# Patient Record
Sex: FEMALE | RURAL
[De-identification: names, ages, dates, MRNs, and addresses within clinical notes are randomized per-mention and may not be internally consistent; named-entity substitution may affect disease eponyms.]

---

## 2018-04-02 ENCOUNTER — APPOINTMENT (OUTPATIENT)
Age: 83
Setting detail: DERMATOLOGY
End: 2018-04-03

## 2018-04-02 DIAGNOSIS — D22 MELANOCYTIC NEVI: ICD-10-CM

## 2018-04-02 DIAGNOSIS — L57.0 ACTINIC KERATOSIS: ICD-10-CM

## 2018-04-02 DIAGNOSIS — L82.1 OTHER SEBORRHEIC KERATOSIS: ICD-10-CM

## 2018-04-02 DIAGNOSIS — D485 NEOPLASM OF UNCERTAIN BEHAVIOR OF SKIN: ICD-10-CM

## 2018-04-02 DIAGNOSIS — L85.3 XEROSIS CUTIS: ICD-10-CM

## 2018-04-02 PROBLEM — D48.5 NEOPLASM OF UNCERTAIN BEHAVIOR OF SKIN: Status: ACTIVE | Noted: 2018-04-02

## 2018-04-02 PROBLEM — D22.39 MELANOCYTIC NEVI OF OTHER PARTS OF FACE: Status: ACTIVE | Noted: 2018-04-02

## 2018-04-02 PROCEDURE — OTHER COUNSELING: OTHER

## 2018-04-02 PROCEDURE — 11100: CPT | Mod: 59

## 2018-04-02 PROCEDURE — 17000 DESTRUCT PREMALG LESION: CPT

## 2018-04-02 PROCEDURE — OTHER LIQUID NITROGEN: OTHER

## 2018-04-02 PROCEDURE — 99203 OFFICE O/P NEW LOW 30 MIN: CPT | Mod: 25

## 2018-04-02 PROCEDURE — OTHER BIOPSY BY SHAVE METHOD: OTHER

## 2018-04-02 PROCEDURE — 17003 DESTRUCT PREMALG LES 2-14: CPT

## 2018-04-02 ASSESSMENT — LOCATION DETAILED DESCRIPTION DERM
LOCATION DETAILED: LEFT DISTAL DORSAL FOREARM
LOCATION DETAILED: LEFT PROXIMAL PRETIBIAL REGION
LOCATION DETAILED: RIGHT DISTAL DORSAL FOREARM
LOCATION DETAILED: RIGHT PROXIMAL DORSAL FOREARM
LOCATION DETAILED: RIGHT PROXIMAL PRETIBIAL REGION
LOCATION DETAILED: LEFT RADIAL DORSAL HAND
LOCATION DETAILED: LEFT ULNAR DORSAL HAND
LOCATION DETAILED: LEFT LATERAL PROXIMAL CALF
LOCATION DETAILED: LEFT PROXIMAL DORSAL FOREARM
LOCATION DETAILED: LEFT MEDIAL BUCCAL CHEEK

## 2018-04-02 ASSESSMENT — LOCATION SIMPLE DESCRIPTION DERM
LOCATION SIMPLE: LEFT CHEEK
LOCATION SIMPLE: RIGHT FOREARM
LOCATION SIMPLE: LEFT PRETIBIAL REGION
LOCATION SIMPLE: LEFT FOREARM
LOCATION SIMPLE: LEFT LOWER LEG
LOCATION SIMPLE: RIGHT PRETIBIAL REGION
LOCATION SIMPLE: LEFT HAND

## 2018-04-02 ASSESSMENT — LOCATION ZONE DERM
LOCATION ZONE: HAND
LOCATION ZONE: FACE
LOCATION ZONE: LEG
LOCATION ZONE: ARM

## 2018-04-02 NOTE — PROCEDURE: BIOPSY BY SHAVE METHOD
Render Post-Care Instructions In Note?: no
Billing Type: Third-Party Bill
Cryotherapy Text: The wound bed was treated with cryotherapy after the biopsy was performed.
Silver Nitrate Text: The wound bed was treated with silver nitrate after the biopsy was performed.
Consent: Written consent was obtained and risks were reviewed including but not limited to scarring, infection, bleeding, scabbing, incomplete removal, nerve damage and allergy to anesthesia.
Size Of Lesion In Cm: 0.5
Notification Instructions: Patient will be notified of biopsy results. However, patient instructed to call the office if not contacted within 2 weeks.
Type Of Destruction Used: Curettage
X Size Of Lesion In Cm: 0
Biopsy Method: 10 blade
Anesthesia Type: 1% lidocaine with epinephrine
Hemostasis: Electrocautery
Wound Care: Vaseline
Biopsy Type: H and E
Electrodesiccation Text: The wound bed was treated with electrodesiccation after the biopsy was performed.
Detail Level: Detailed
Curettage Text: The wound bed was treated with curettage after the biopsy was performed.
Dressing: bandage
Post-Care Instructions: I reviewed with the patient in detail post-care instructions. Patient is to keep the biopsy site dry overnight, and then apply bacitracin twice daily until healed. Patient may apply hydrogen peroxide soaks to remove any crusting.
Electrodesiccation And Curettage Text: The wound bed was treated with electrodesiccation and curettage after the biopsy was performed.

## 2018-04-23 ENCOUNTER — APPOINTMENT (OUTPATIENT)
Age: 83
Setting detail: DERMATOLOGY
End: 2018-04-24

## 2018-04-23 ENCOUNTER — APPOINTMENT (OUTPATIENT)
Age: 83
Setting detail: DERMATOLOGY
End: 2018-04-25

## 2018-04-23 PROBLEM — C44.719 BASAL CELL CARCINOMA OF SKIN OF LEFT LOWER LIMB, INCLUDING HIP: Status: ACTIVE | Noted: 2018-04-23

## 2018-04-23 PROCEDURE — OTHER SUPERFICIAL RADIATION TREATMENT: OTHER

## 2018-04-23 PROCEDURE — 77280 THER RAD SIMULAJ FIELD SMPL: CPT

## 2018-04-23 PROCEDURE — 99213 OFFICE O/P EST LOW 20 MIN: CPT

## 2018-04-23 PROCEDURE — 77334 RADIATION TREATMENT AID(S): CPT

## 2018-04-23 PROCEDURE — 77300 RADIATION THERAPY DOSE PLAN: CPT

## 2018-04-23 PROCEDURE — OTHER COUNSELING: OTHER

## 2018-04-23 PROCEDURE — 99214 OFFICE O/P EST MOD 30 MIN: CPT | Mod: 25

## 2018-04-23 PROCEDURE — 77261 THER RADIOLOGY TX PLNG SMPL: CPT

## 2018-04-23 PROCEDURE — OTHER OTHER: OTHER

## 2018-04-23 NOTE — PROCEDURE: OTHER
Detail Level: Zone
Note Text (......Xxx Chief Complaint.): This diagnosis correlates with the
Other (Free Text): Discussed mohs, SRT. Pt decided to proceed with SRT.

## 2018-04-23 NOTE — PROCEDURE: SUPERFICIAL RADIATION TREATMENT
Patient Positioning: Sitting
Custom Shielding Afterword Text Will Not Be Included With Simple Simulations (X X Y Cm............): port to correlate with the lesion size, including treatment margin. The custom lead shield is adequate to accommodate the appropriate applicator and provide adequate shielding around the treatment site. Additional shielding (as noted below) is used to protect sensitive, normal tissues.
Bill For Dosimetry/Render Decay And Dose Adjustment Calculation In Note: No
Energy (Optional-Please Include Units): 70kV
Bill For Simulation And Treatment Device Design: Yes
Field Size (Applicator): 3.0 cm
Fractions / Week Rx 3: 5
Total Number Of Fractions: 20
Treatment Time In Min (Optional): 0.39
Simple Simulation Afterword Text Will Be Included With Simple Simulations (Indications............): The patient had a complete consultation regarding all applicable modalities for the treatment of their skin cancer and based on a variety of factors including the type of tumor, size, and location, the relevant medical history as well as local tissue factors, the functional status of the individual, the ability to perform necessary postoperative wound instructions and the need for simultaneous treatments as well as overall wound healing status, it was determined that the patient would begin radiation therapy treatment for skin cancer.  A full simulation and treatment device design was performed including the determination and formulation of appropriate simple and complex devices including lead shield of 0.762 mm thickness to form molded customized shielding to specifically correlate with the lesion size including treatment margin.  The custom lead shield is adequate to accommodate the appropriate applicator and provide adequate shielding around the treatment site.  The specific field applicator, shields, and devices both simple and complex as well as the specific patient setup is outlined below.  The patient was given a full consent for superficial radiation to both verbally and in writing and the full determination of patient's eligibility for treatment and selection is outlined on the patient eligibility and treatment selection form.  The specific superficial radiotherapy prescription was determined and was documented on the superficial radiotherapy prescription form.  A treatment calculation was also performed and documented on the treatment calculation form.  Based on the prescription, the patient was scheduled for a series of fractional treatments.
Number Of Days Off Treatment: 1
Port Dimensions-X Axis In Cm: 2.5
Simple Simulation Preamble Text Will Be Included With Simple Simulations (.......... Indications): Simple simulation was performed today for the following reasons:
Custom Shielding Preamble Text Will Not Be Included With Simple Simulations (.......... X X Y Cm): A lead shield of 0.762 mm thickness is utilized to form a molded, custom shield with a
Dimensions-X Axis In Cm: 1.5
Functional Status: 2 (ambulatory, performs self-care)
Treatment Device Design After Initial Simulation Justification (Will Render If Bill For Treatment Devices = Yes): The patient is status post radiation simulation and is evaluated as to the use of additional devices for shielding and placement for radiation therapy.
Shielding Size (Optional- Include Units) Rx 2: 2.5 x 2.5
Total Dose (Optional-Please Include Units): 5155.8cGy
Assessment: Appropriate reaction
Fractions / Week Rx 2: 4
Dose / Tx In Cgy (Optional): 257.79
Time Dose Fractionation (Optional- Include Units If Applicable): 89
Fractionation Number: 0
Daily Fractionated Dose (Optional- Include Units): 257.79cGy
Intro Statement (Will Not Render If Left Blank): The patient is undergoing superficial radiation therapy for skin cancer and presents for weekly evaluation and management.  Per protocol and as documented on the flow sheet, the patient was questioned as to subjective redness, pruritus, pain, drainage, fatigue, or any other symptoms.  Objectively, the radiation area was evaluated with regards to erythema, atrophy, scale, crusting, erosion, ulceration, edema, purpura, tenderness, warmth, drainage, and any other findings.  The plan was extensively reviewed including the dose, and dosing schedule.  The simulation and clinical setup was also reviewed as was the external and any internal shields and based on this review the appropriateness and sufficiency of treatment was determined.
Detail Level: Detailed
Total Number Of Fractions Rx 3: 15
Treatment Margins In Cm: 0.5
Additional Prescription Justification Text: If there is any interruption in treatment exceeding 5 days please see Decay and Dose Adjustment Calculation and complete treatment under Prescription 2.
Computed Treatment Time In Min (Will Render The Same As Calculated Treatment Time If Left Blank): per device
Energy (Optional-Please Include Units) Rx 2: 50 kV
Daily Fractionated Dose (Optional- Include Units) Rx 2: 255 cGy
Fractions / Week: 3

## 2018-04-27 ENCOUNTER — APPOINTMENT (OUTPATIENT)
Age: 83
Setting detail: DERMATOLOGY
End: 2018-04-27

## 2018-04-27 PROBLEM — C44.719 BASAL CELL CARCINOMA OF SKIN OF LEFT LOWER LIMB, INCLUDING HIP: Status: ACTIVE | Noted: 2018-04-27

## 2018-04-27 PROCEDURE — 77401 RADIATION TX DELIVERY SUPFC: CPT

## 2018-04-27 PROCEDURE — 77280 THER RAD SIMULAJ FIELD SMPL: CPT

## 2018-04-27 PROCEDURE — OTHER SUPERFICIAL RADIATION TREATMENT: OTHER

## 2018-04-27 PROCEDURE — G6001 ECHO GUIDANCE RADIOTHERAPY: HCPCS

## 2018-04-27 PROCEDURE — OTHER TREATMENT REGIMEN: OTHER

## 2018-04-27 NOTE — PROCEDURE: TREATMENT REGIMEN
Detail Level: Zone
Plan: Per the request of Dr. Berry, Nallely Lopes, was seen today for Superficial Radiation Therapy  requiring simulation (CPT 64197) in preparation for treatment specific diseased site.  Simulation is necessary to determine correct patient ad treatment portal positioning, deliver safe and effective radiation therapy.  A high frequency ultrasound image was acquired prior to treatment today for three dimensional evaluation of tumor volume and response to treatment, in addition, geometric accuracy of field placement (CPT ).  Physician evaluation of the ultrasound tumor depth will be ongoing through course of treatment.  Today's image and setup was evaluated determining continuation of treatment with the current plan, or necessary changes as appropriate.  All appropriate custom blocking and treatment parameters verified by the radiation therapist according to initial simulation.  \\n\\nPer Dr. Berry, continued daily US guidance and simulation is required for field placement, measurement of tumor depth, progress and edema monitoring.  \\n\\nEvaluation prior to treatment for response and reaction to SRT based on current fraction and cumulative dose with a visual inspection and ultrasound demonstrates a normal expected response.  RTOG Acute Radiation Morbidity Score = 2.  Superficial Radiation Therapy will continue as planned.  \\n\\nUS image guidance and field placement prior to treatment delivery performed.  \\nUS depth is 1.06 mm  TONIO 3.415mm^2

## 2018-04-27 NOTE — PROCEDURE: SUPERFICIAL RADIATION TREATMENT
Functional Status: 2 (ambulatory, performs self-care)
Kurt For Simulation Without Treatment Device Design (Simple Simulation): No
Treatment Margins In Cm: 0.5
Intro Statement (Will Not Render If Left Blank): Per the request of Dr. Berry, Nallely Lopes, was seen today for Superficial Radiation Therapy  requiring simulation (CPT 99842) in preparation for treatment specific diseased site.  Simulation is necessary to determine correct patient ad treatment portal positioning, deliver safe and effective radiation therapy.  A high frequency ultrasound image was acquired prior to treatment today for three dimensional evaluation of tumor volume and response to treatment, in addition, geometric accuracy of field placement (CPT ).  Physician evaluation of the ultrasound tumor depth will be ongoing through course of treatment.  Today's image and setup was evaluated determining continuation of treatment with the current plan, or necessary changes as appropriate.  All appropriate custom blocking and treatment parameters verified by the radiation therapist according to initial simulation.  \\n\\nPer Dr. Berry, continued daily US guidance and simulation is required for field placement, measurement of tumor depth, progress and edema monitoring.  \\n\\nEvaluation prior to treatment for response and reaction to SRT based on current fraction and cumulative dose with a visual inspection and ultrasound demonstrates a normal expected response.  RTOG Acute Radiation Morbidity Score = 2.  Superficial Radiation Therapy will continue as planned.  \\n\\nUS image guidance and field placement prior to treatment delivery performed.  \\nUS depth is 1.06 mm  TONIO 3.415mm^2
Daily Fractionated Dose (Optional- Include Units): 257.79cGy
Assessment: Appropriate reaction
Fractionation Number (Evaluation): 15
Detail Level: Detailed
Dimensions-Y Axis In Cm: 1.5
Bill For Radiation Treatment: Yes
Port Dimensions-Y Axis In Cm: 2.5
Custom Shielding Afterword Text Will Not Be Included With Simple Simulations (X X Y Cm............): port to correlate with the lesion size, including treatment margin. The custom lead shield is adequate to accommodate the appropriate applicator and provide adequate shielding around the treatment site. Additional shielding (as noted below) is used to protect sensitive, normal tissues.
Energy (Optional-Please Include Units): 70kV
Additional Prescription Justification Text: If there is any interruption in treatment exceeding 5 days please see Decay and Dose Adjustment Calculation and complete treatment under Prescription 2.
Shielding Size (Optional- Include Units): 2.5 X 2.5
Daily Fractionated Dose (Optional- Include Units) Rx 2: 255 cGy
Total Number Of Fractions: 20
Cumulative Dose In Cgy (Optional): 257.79
Number Of Treatment Days: 1
Fractions / Week: 3
Simple Simulation Afterword Text Will Be Included With Simple Simulations (Indications............): The patient had a complete consultation regarding all applicable modalities for the treatment of their skin cancer and based on a variety of factors including the type of tumor, size, and location, the relevant medical history as well as local tissue factors, the functional status of the individual, the ability to perform necessary postoperative wound instructions and the need for simultaneous treatments as well as overall wound healing status, it was determined that the patient would begin radiation therapy treatment for skin cancer.  A full simulation and treatment device design was performed including the determination and formulation of appropriate simple and complex devices including lead shield of 0.762 mm thickness to form molded customized shielding to specifically correlate with the lesion size including treatment margin.  The custom lead shield is adequate to accommodate the appropriate applicator and provide adequate shielding around the treatment site.  The specific field applicator, shields, and devices both simple and complex as well as the specific patient setup is outlined below.  The patient was given a full consent for superficial radiation to both verbally and in writing and the full determination of patient's eligibility for treatment and selection is outlined on the patient eligibility and treatment selection form.  The specific superficial radiotherapy prescription was determined and was documented on the superficial radiotherapy prescription form.  A treatment calculation was also performed and documented on the treatment calculation form.  Based on the prescription, the patient was scheduled for a series of fractional treatments.
Treatment Device Design After Initial Simulation Justification (Will Render If Bill For Treatment Devices = Yes): The patient is status post radiation simulation and is evaluated as to the use of additional devices for shielding and placement for radiation therapy.
Total Dose (Optional-Please Include Units): 5155.8cGy
Field Size (Applicator): 3.0 cm
Treatment Time / Fractionation (Optional- Include Units): 0.39
Fractions / Week Rx 2: 4
Custom Shielding Preamble Text Will Not Be Included With Simple Simulations (.......... X X Y Cm): A lead shield of 0.762 mm thickness is utilized to form a molded, custom shield with a
Simple Simulation Preamble Text Will Be Included With Simple Simulations (.......... Indications): Simple simulation was performed today for the following reasons:
Day Of The Week Treatment Administered: Friday
Energy (Optional-Please Include Units) Rx 2: 50 kV
Fractions / Week Rx 3: 5
Computed Treatment Time In Min (Will Render The Same As Calculated Treatment Time If Left Blank): per device
Patient Positioning: Sitting
Time Dose Fractionation (Optional- Include Units If Applicable): 89

## 2018-04-30 ENCOUNTER — APPOINTMENT (OUTPATIENT)
Age: 83
Setting detail: DERMATOLOGY
End: 2018-04-30

## 2018-04-30 PROBLEM — C44.719 BASAL CELL CARCINOMA OF SKIN OF LEFT LOWER LIMB, INCLUDING HIP: Status: ACTIVE | Noted: 2018-04-30

## 2018-04-30 PROCEDURE — 77401 RADIATION TX DELIVERY SUPFC: CPT

## 2018-04-30 PROCEDURE — OTHER SUPERFICIAL RADIATION TREATMENT: OTHER

## 2018-04-30 PROCEDURE — 77280 THER RAD SIMULAJ FIELD SMPL: CPT

## 2018-04-30 PROCEDURE — G6001 ECHO GUIDANCE RADIOTHERAPY: HCPCS

## 2018-04-30 PROCEDURE — OTHER TREATMENT REGIMEN: OTHER

## 2018-04-30 NOTE — PROCEDURE: SUPERFICIAL RADIATION TREATMENT
Custom Shielding Afterword Text Will Not Be Included With Simple Simulations (X X Y Cm............): port to correlate with the lesion size, including treatment margin. The custom lead shield is adequate to accommodate the appropriate applicator and provide adequate shielding around the treatment site. Additional shielding (as noted below) is used to protect sensitive, normal tissues.
Render Patient Eligibility And Selection In Note?: No
Dimensions-X Axis In Cm: 1.5
Number Of Days Off Treatment: 1
Energy (Optional-Please Include Units) Rx 2: 50 kV
Treatment Time In Min (Optional): 0.39
Functional Status: 2 (ambulatory, performs self-care)
Dose Per Fractionation In Cgy (Optional): 257.79
Fractionation Number (Evaluation): 15
Treatment Margins In Cm: 0.5
Shielding Size (Optional- Include Units) Rx 2: 2.5 x 2.5
Additional Prescription Justification Text: If there is any interruption in treatment exceeding 5 days please see Decay and Dose Adjustment Calculation and complete treatment under Prescription 2.
Field Size (Applicator) Rx 2: 3.0 cm
Fractionation Number: 2
Fractions / Week: 3
Cumulative Dose In Cgy (Optional): 515.58
Fractions / Week Rx 3: 5
Total Number Of Fractions: 20
Initial Radiation Treatment Planning (Will Render If Bill Simulation = Yes): The patient had a complete consultation regarding all applicable modalities for the treatment of their skin cancer and based on a variety of factors including the type of tumor, size, and location, the relevant medical history as well as local tissue factors, the functional status of the individual, the ability to perform necessary postoperative wound instructions and the need for simultaneous treatments as well as overall wound healing status, it was determined that the patient would begin radiation therapy treatment for skin cancer.  A full simulation and treatment device design was performed including the determination and formulation of appropriate simple and complex devices including lead shield of 0.762 mm thickness to form molded customized shielding to specifically correlate with the lesion size including treatment margin.  The custom lead shield is adequate to accommodate the appropriate applicator and provide adequate shielding around the treatment site.  The specific field applicator, shields, and devices both simple and complex as well as the specific patient setup is outlined below.  The patient was given a full consent for superficial radiation to both verbally and in writing and the full determination of patient's eligibility for treatment and selection is outlined on the patient eligibility and treatment selection form.  The specific superficial radiotherapy prescription was determined and was documented on the superficial radiotherapy prescription form.  A treatment calculation was also performed and documented on the treatment calculation form.  Based on the prescription, the patient was scheduled for a series of fractional treatments.
Bill For Radiation Treatment: Yes
Computed Treatment Time In Min (Will Render The Same As Calculated Treatment Time If Left Blank): per device
Patient Positioning: Sitting
Assessment: Appropriate reaction
Energy (Include Units): 70kV
Simple Simulation Preamble Text Will Be Included With Simple Simulations (.......... Indications): Simple simulation was performed today for the following reasons:
Intro Statement (Will Not Render If Left Blank): Per the request of Dr. Berry, Nallely Lopes, was seen today for Superficial Radiation Therapy  requiring simulation (CPT 60974) in preparation for treatment specific diseased site.  Simulation is necessary to determine correct patient ad treatment portal positioning, deliver safe and effective radiation therapy.  A high frequency ultrasound image was acquired prior to treatment today for three dimensional evaluation of tumor volume and response to treatment, in addition, geometric accuracy of field placement (CPT ).  Physician evaluation of the ultrasound tumor depth will be ongoing through course of treatment.  Today's image and setup was evaluated determining continuation of treatment with the current plan, or necessary changes as appropriate.  All appropriate custom blocking and treatment parameters verified by the radiation therapist according to initial simulation.  \\n\\nPer Dr. Berry, continued daily US guidance and simulation is required for field placement, measurement of tumor depth, progress and edema monitoring.  \\n\\nEvaluation prior to treatment for response and reaction to SRT based on current fraction and cumulative dose with a visual inspection and ultrasound demonstrates a normal expected response.  RTOG Acute Radiation Morbidity Score = 2.  Superficial Radiation Therapy will continue as planned.  \\n\\nUS image guidance and field placement prior to treatment delivery performed.  \\nUS depth is 1.06 mm  TONIO 3.415mm^2
Detail Level: Detailed
Daily Fractionated Dose (Optional- Include Units) Rx 2: 255 cGy
Treatment Device Design After Initial Simulation Justification (Will Render If Bill For Treatment Devices = Yes): The patient is status post radiation simulation and is evaluated as to the use of additional devices for shielding and placement for radiation therapy.
Daily Fractionated Dose (Optional- Include Units): 257.79cGy
Total Dose (Optional-Please Include Units): 5155.8cGy
Port Dimensions-X Axis In Cm: 2.5
Custom Shielding Preamble Text Will Not Be Included With Simple Simulations (.......... X X Y Cm): A lead shield of 0.762 mm thickness is utilized to form a molded, custom shield with a
Fractions / Week Rx 2: 4
Time Dose Fractionation (Optional- Include Units If Applicable): 89
Day Of The Week Treatment Administered: Monday

## 2018-04-30 NOTE — PROCEDURE: TREATMENT REGIMEN
Detail Level: Zone
Plan: Per the request of Dr. Berry, Nallely Lopes, was seen today for Superficial Radiation Therapy  requiring simulation (CPT 53914) in preparation for treatment specific diseased site.  Simulation is necessary to determine correct patient ad treatment portal positioning, deliver safe and effective radiation therapy.  A high frequency ultrasound image was acquired prior to treatment today for three dimensional evaluation of tumor volume and response to treatment, in addition, geometric accuracy of field placement (CPT ).  Physician evaluation of the ultrasound tumor depth will be ongoing through course of treatment.  Today's image and setup was evaluated determining continuation of treatment with the current plan, or necessary changes as appropriate.  All appropriate custom blocking and treatment parameters verified by the radiation therapist according to initial simulation.  \\n\\nPer Dr. Berry, continued daily US guidance and simulation is required for field placement, measurement of tumor depth, progress and edema monitoring.  \\n\\nEvaluation prior to treatment for response and reaction to SRT based on current fraction and cumulative dose with a visual inspection and ultrasound demonstrates a normal expected response.  RTOG Acute Radiation Morbidity Score = 2.  Superficial Radiation Therapy will continue as planned.  \\n\\nUS image guidance and field placement prior to treatment delivery performed.  \\nUS depth is 1.43 mm  TONIO 6.294mm^2

## 2018-05-02 ENCOUNTER — APPOINTMENT (OUTPATIENT)
Age: 83
Setting detail: DERMATOLOGY
End: 2018-05-07

## 2018-05-02 PROBLEM — C44.719 BASAL CELL CARCINOMA OF SKIN OF LEFT LOWER LIMB, INCLUDING HIP: Status: ACTIVE | Noted: 2018-05-02

## 2018-05-02 PROCEDURE — G6001 ECHO GUIDANCE RADIOTHERAPY: HCPCS

## 2018-05-02 PROCEDURE — OTHER SUPERFICIAL RADIATION TREATMENT: OTHER

## 2018-05-02 PROCEDURE — 77280 THER RAD SIMULAJ FIELD SMPL: CPT

## 2018-05-02 PROCEDURE — OTHER TREATMENT REGIMEN: OTHER

## 2018-05-02 PROCEDURE — 77401 RADIATION TX DELIVERY SUPFC: CPT

## 2018-05-02 NOTE — PROCEDURE: TREATMENT REGIMEN
Plan: Per the request of Dr. Berry, Nallely Lopes, was seen today for Superficial Radiation Therapy  requiring simulation (CPT 34825) in preparation for treatment specific diseased site.  Simulation is necessary to determine correct patient ad treatment portal positioning, deliver safe and effective radiation therapy.  A high frequency ultrasound image was acquired prior to treatment today for three dimensional evaluation of tumor volume and response to treatment, in addition, geometric accuracy of field placement (CPT ).  Physician evaluation of the ultrasound tumor depth will be ongoing through course of treatment.  Today's image and setup was evaluated determining continuation of treatment with the current plan, or necessary changes as appropriate.  All appropriate custom blocking and treatment parameters verified by the radiation therapist according to initial simulation.  \\n\\nPer Dr. Berry, continued daily US guidance and simulation is required for field placement, measurement of tumor depth, progress and edema monitoring.  \\n\\nEvaluation prior to treatment for response and reaction to SRT based on current fraction and cumulative dose with a visual inspection and ultrasound demonstrates a normal expected response.  RTOG Acute Radiation Morbidity Score = 2.  Superficial Radiation Therapy will continue as planned.  \\n\\nUS image guidance and field placement prior to treatment delivery performed.  \\nUS depth is 1.08 mm  TONIO 4.514mm^2
Detail Level: Zone

## 2018-05-02 NOTE — PROCEDURE: SUPERFICIAL RADIATION TREATMENT
Daily Fractionated Dose (Optional- Include Units) Rx 2: 255 cGy
Port Dimensions-X Axis In Cm: 2.5
Fractions / Week Rx 4: 5
Day Of The Week Treatment Administered: Wednesday
Treatment Device Design After Initial Simulation Justification (Will Render If Bill For Treatment Devices = Yes): The patient is status post radiation simulation and is evaluated as to the use of additional devices for shielding and placement for radiation therapy.
Functional Status: 2 (ambulatory, performs self-care)
Detail Level: Detailed
Shielding Size (Optional- Include Units): 2.5 X 2.5
Field Size (Applicator): 3.0 cm
Additional Prescription Justification Text: If there is any interruption in treatment exceeding 5 days please see Decay and Dose Adjustment Calculation and complete treatment under Prescription 2.
Prescription Used: 1
Total Number Of Fractions Rx 2: 15
Simple Simulation Preamble Text Will Be Included With Simple Simulations (.......... Indications): Simple simulation was performed today for the following reasons:
Treatment Margins In Cm: 0.5
Bill For Radiation Treatment: Yes
Energy (Optional-Please Include Units): 70kV
Custom Shielding Afterword Text Will Not Be Included With Simple Simulations (X X Y Cm............): port to correlate with the lesion size, including treatment margin. The custom lead shield is adequate to accommodate the appropriate applicator and provide adequate shielding around the treatment site. Additional shielding (as noted below) is used to protect sensitive, normal tissues.
Patient Positioning: Sitting
Bill For Simulation And Treatment Device Design: No
Total Dose (Optional-Please Include Units): 5155.8cGy
Computed Treatment Time In Min (Will Render The Same As Calculated Treatment Time If Left Blank): per device
Cumulative Dose In Cgy (Optional): 773.37
Intro Statement (Will Not Render If Left Blank): Per the request of Dr. Berry, Nallely Lopes, was seen today for Superficial Radiation Therapy  requiring simulation (CPT 77061) in preparation for treatment specific diseased site.  Simulation is necessary to determine correct patient ad treatment portal positioning, deliver safe and effective radiation therapy.  A high frequency ultrasound image was acquired prior to treatment today for three dimensional evaluation of tumor volume and response to treatment, in addition, geometric accuracy of field placement (CPT ).  Physician evaluation of the ultrasound tumor depth will be ongoing through course of treatment.  Today's image and setup was evaluated determining continuation of treatment with the current plan, or necessary changes as appropriate.  All appropriate custom blocking and treatment parameters verified by the radiation therapist according to initial simulation.  \\n\\nPer Dr. Berry, continued daily US guidance and simulation is required for field placement, measurement of tumor depth, progress and edema monitoring.  \\n\\nEvaluation prior to treatment for response and reaction to SRT based on current fraction and cumulative dose with a visual inspection and ultrasound demonstrates a normal expected response.  RTOG Acute Radiation Morbidity Score = 2.  Superficial Radiation Therapy will continue as planned.  \\n\\nUS image guidance and field placement prior to treatment delivery performed.  \\nUS depth is 1.06 mm  TONIO 3.415mm^2
Fractions / Week Rx 2: 4
Total Number Of Fractions: 20
Energy (Optional-Please Include Units) Rx 2: 50 kV
Dose / Tx In Cgy (Optional): 257.79
Fractions / Week: 3
Assessment: Appropriate reaction
Simple Simulation Afterword Text Will Be Included With Simple Simulations (Indications............): The patient had a complete consultation regarding all applicable modalities for the treatment of their skin cancer and based on a variety of factors including the type of tumor, size, and location, the relevant medical history as well as local tissue factors, the functional status of the individual, the ability to perform necessary postoperative wound instructions and the need for simultaneous treatments as well as overall wound healing status, it was determined that the patient would begin radiation therapy treatment for skin cancer.  A full simulation and treatment device design was performed including the determination and formulation of appropriate simple and complex devices including lead shield of 0.762 mm thickness to form molded customized shielding to specifically correlate with the lesion size including treatment margin.  The custom lead shield is adequate to accommodate the appropriate applicator and provide adequate shielding around the treatment site.  The specific field applicator, shields, and devices both simple and complex as well as the specific patient setup is outlined below.  The patient was given a full consent for superficial radiation to both verbally and in writing and the full determination of patient's eligibility for treatment and selection is outlined on the patient eligibility and treatment selection form.  The specific superficial radiotherapy prescription was determined and was documented on the superficial radiotherapy prescription form.  A treatment calculation was also performed and documented on the treatment calculation form.  Based on the prescription, the patient was scheduled for a series of fractional treatments.
Treatment Time In Min (Optional): 0.39
Custom Shielding Preamble Text Will Not Be Included With Simple Simulations (.......... X X Y Cm): A lead shield of 0.762 mm thickness is utilized to form a molded, custom shield with a
Daily Fractionated Dose (Optional- Include Units): 257.79cGy
Time Dose Fractionation (Optional- Include Units If Applicable): 89
Dimensions-X Axis In Cm: 1.5

## 2018-05-04 ENCOUNTER — APPOINTMENT (OUTPATIENT)
Age: 83
Setting detail: DERMATOLOGY
End: 2018-05-07

## 2018-05-04 PROBLEM — C44.719 BASAL CELL CARCINOMA OF SKIN OF LEFT LOWER LIMB, INCLUDING HIP: Status: ACTIVE | Noted: 2018-05-04

## 2018-05-04 PROCEDURE — OTHER SUPERFICIAL RADIATION TREATMENT: OTHER

## 2018-05-04 PROCEDURE — 77401 RADIATION TX DELIVERY SUPFC: CPT

## 2018-05-04 PROCEDURE — OTHER TREATMENT REGIMEN: OTHER

## 2018-05-04 PROCEDURE — G6001 ECHO GUIDANCE RADIOTHERAPY: HCPCS

## 2018-05-04 PROCEDURE — 77280 THER RAD SIMULAJ FIELD SMPL: CPT

## 2018-05-04 NOTE — PROCEDURE: SUPERFICIAL RADIATION TREATMENT
Energy (Optional-Please Include Units): 70kV
Treatment Time In Min (Optional): 0.39
Daily Fractionated Dose (Optional- Include Units): 257.79cGy
Number Of Days Off Treatment: 1
Kurt For Simulation Without Treatment Device Design (Simple Simulation): No
Fractions / Week Rx 3: 5
Detail Level: Detailed
Total Number Of Fractions Rx 3: 15
Treatment Margins In Cm: 0.5
Treatment Device Design After Initial Simulation Justification (Will Render If Bill For Treatment Devices = Yes): The patient is status post radiation simulation and is evaluated as to the use of additional devices for shielding and placement for radiation therapy.
Initial Radiation Treatment Planning (Will Render If Bill Simulation = Yes): The patient had a complete consultation regarding all applicable modalities for the treatment of their skin cancer and based on a variety of factors including the type of tumor, size, and location, the relevant medical history as well as local tissue factors, the functional status of the individual, the ability to perform necessary postoperative wound instructions and the need for simultaneous treatments as well as overall wound healing status, it was determined that the patient would begin radiation therapy treatment for skin cancer.  A full simulation and treatment device design was performed including the determination and formulation of appropriate simple and complex devices including lead shield of 0.762 mm thickness to form molded customized shielding to specifically correlate with the lesion size including treatment margin.  The custom lead shield is adequate to accommodate the appropriate applicator and provide adequate shielding around the treatment site.  The specific field applicator, shields, and devices both simple and complex as well as the specific patient setup is outlined below.  The patient was given a full consent for superficial radiation to both verbally and in writing and the full determination of patient's eligibility for treatment and selection is outlined on the patient eligibility and treatment selection form.  The specific superficial radiotherapy prescription was determined and was documented on the superficial radiotherapy prescription form.  A treatment calculation was also performed and documented on the treatment calculation form.  Based on the prescription, the patient was scheduled for a series of fractional treatments.
Day Of The Week Treatment Administered: Friday
Assessment: Appropriate reaction
Custom Shielding Afterword Text Will Not Be Included With Simple Simulations (X X Y Cm............): port to correlate with the lesion size, including treatment margin. The custom lead shield is adequate to accommodate the appropriate applicator and provide adequate shielding around the treatment site. Additional shielding (as noted below) is used to protect sensitive, normal tissues.
Fractions / Week Rx 2: 4
Field Size (Applicator): 3.0 cm
Port Dimensions-X Axis In Cm: 2.5
Cumulative Dose In Cgy (Optional): 1031.16
Patient Positioning: Sitting
Shielding Size (Optional- Include Units): 2.5 X 2.5
Functional Status: 2 (ambulatory, performs self-care)
Time Dose Fractionation (Optional- Include Units If Applicable): 89
Custom Shielding Preamble Text Will Not Be Included With Simple Simulations (.......... X X Y Cm): A lead shield of 0.762 mm thickness is utilized to form a molded, custom shield with a
Dose / Tx In Cgy (Optional): 257.79
Fractions / Week: 3
Simple Simulation Preamble Text Will Be Included With Simple Simulations (.......... Indications): Simple simulation was performed today for the following reasons:
Additional Prescription Justification Text: If there is any interruption in treatment exceeding 5 days please see Decay and Dose Adjustment Calculation and complete treatment under Prescription 2.
Energy (Optional-Please Include Units) Rx 2: 50 kV
Computed Treatment Time In Min (Will Render The Same As Calculated Treatment Time If Left Blank): per device
Bill For Radiation Treatment: Yes
Intro Statement (Will Not Render If Left Blank): Per the request of Dr. Berry, Nallely Lopes, was seen today for Superficial Radiation Therapy  requiring simulation (CPT 25356) in preparation for treatment specific diseased site.  Simulation is necessary to determine correct patient ad treatment portal positioning, deliver safe and effective radiation therapy.  A high frequency ultrasound image was acquired prior to treatment today for three dimensional evaluation of tumor volume and response to treatment, in addition, geometric accuracy of field placement (CPT ).  Physician evaluation of the ultrasound tumor depth will be ongoing through course of treatment.  Today's image and setup was evaluated determining continuation of treatment with the current plan, or necessary changes as appropriate.  All appropriate custom blocking and treatment parameters verified by the radiation therapist according to initial simulation.  \\n\\nPer Dr. Berry, continued daily US guidance and simulation is required for field placement, measurement of tumor depth, progress and edema monitoring.  \\n\\nEvaluation prior to treatment for response and reaction to SRT based on current fraction and cumulative dose with a visual inspection and ultrasound demonstrates a normal expected response.  RTOG Acute Radiation Morbidity Score = 2.  Superficial Radiation Therapy will continue as planned.  \\n\\nUS image guidance and field placement prior to treatment delivery performed.  \\nUS depth is 1.06 mm  TONIO 3.415mm^2
Total Number Of Fractions: 20
Dimensions-Y Axis In Cm: 1.5
Total Dose (Optional-Please Include Units): 5155.8cGy
Daily Fractionated Dose (Optional- Include Units) Rx 2: 255 cGy

## 2018-05-04 NOTE — PROCEDURE: TREATMENT REGIMEN
Plan: Per the request of Dr. Berry, Nallely Lopes, was seen today for Superficial Radiation Therapy  requiring simulation (CPT 75921) in preparation for treatment specific diseased site.  Simulation is necessary to determine correct patient ad treatment portal positioning, deliver safe and effective radiation therapy.  A high frequency ultrasound image was acquired prior to treatment today for three dimensional evaluation of tumor volume and response to treatment, in addition, geometric accuracy of field placement (CPT ).  Physician evaluation of the ultrasound tumor depth will be ongoing through course of treatment.  Today's image and setup was evaluated determining continuation of treatment with the current plan, or necessary changes as appropriate.  All appropriate custom blocking and treatment parameters verified by the radiation therapist according to initial simulation.  \\n\\nPer Dr. Berry, continued daily US guidance and simulation is required for field placement, measurement of tumor depth, progress and edema monitoring.  \\n\\nEvaluation prior to treatment for response and reaction to SRT based on current fraction and cumulative dose with a visual inspection and ultrasound demonstrates a normal expected response.  RTOG Acute Radiation Morbidity Score = 2.  Superficial Radiation Therapy will continue as planned.  \\n\\nUS image guidance and field placement prior to treatment delivery performed.  \\nUS depth is 1.15mm  TONIO 5.997mm^2
Detail Level: Zone

## 2018-05-07 ENCOUNTER — APPOINTMENT (OUTPATIENT)
Age: 83
Setting detail: DERMATOLOGY
End: 2018-05-07

## 2018-05-07 PROBLEM — C44.719 BASAL CELL CARCINOMA OF SKIN OF LEFT LOWER LIMB, INCLUDING HIP: Status: ACTIVE | Noted: 2018-05-07

## 2018-05-07 PROCEDURE — 77280 THER RAD SIMULAJ FIELD SMPL: CPT

## 2018-05-07 PROCEDURE — OTHER SUPERFICIAL RADIATION TREATMENT: OTHER

## 2018-05-07 PROCEDURE — 77427 RADIATION TX MANAGEMENT X5: CPT | Mod: 25

## 2018-05-07 PROCEDURE — G6001 ECHO GUIDANCE RADIOTHERAPY: HCPCS

## 2018-05-07 PROCEDURE — 77401 RADIATION TX DELIVERY SUPFC: CPT

## 2018-05-07 PROCEDURE — OTHER FOLLOW UP FOR NEXT VISIT: OTHER

## 2018-05-07 PROCEDURE — OTHER TREATMENT REGIMEN: OTHER

## 2018-05-07 NOTE — PROCEDURE: SUPERFICIAL RADIATION TREATMENT
Additional Prescription Justification Text: If there is any interruption in treatment exceeding 5 days please see Decay and Dose Adjustment Calculation and complete treatment under Prescription 2.
Bill For Treatment Devices Only: No
Daily Fractionated Dose (Optional- Include Units) Rx 2: 255 cGy
Number Of Treatment Days: 1
Fractions / Week Rx 3: 5
Day Of The Week Treatment Administered: Monday
Energy (Include Units): 70kV
Bill For Radiation Treatment: Yes
Time Dose Fractionation (Optional- Include Units If Applicable): 89
Field Size (Applicator): 3.0 cm
Total Number Of Fractions Rx 2: 15
Treatment Time In Min (Optional): 0.39
Port Dimensions-Y Axis In Cm: 2.5
Patient Positioning: Sitting
Treatment Device Design After Initial Simulation Justification (Will Render If Bill For Treatment Devices = Yes): The patient is status post radiation simulation and is evaluated as to the use of additional devices for shielding and placement for radiation therapy.
Dose / Tx In Cgy (Optional): 257.79
Cumulative Dose In Cgy (Optional): 1288.95
Assessment: Appropriate reaction
Functional Status: 2 (ambulatory, performs self-care)
Treatment Margins In Cm: 0.5
Simple Simulation Preamble Text Will Be Included With Simple Simulations (.......... Indications): Simple simulation was performed today for the following reasons:
Initial Radiation Treatment Planning (Will Render If Bill Simulation = Yes): The patient had a complete consultation regarding all applicable modalities for the treatment of their skin cancer and based on a variety of factors including the type of tumor, size, and location, the relevant medical history as well as local tissue factors, the functional status of the individual, the ability to perform necessary postoperative wound instructions and the need for simultaneous treatments as well as overall wound healing status, it was determined that the patient would begin radiation therapy treatment for skin cancer.  A full simulation and treatment device design was performed including the determination and formulation of appropriate simple and complex devices including lead shield of 0.762 mm thickness to form molded customized shielding to specifically correlate with the lesion size including treatment margin.  The custom lead shield is adequate to accommodate the appropriate applicator and provide adequate shielding around the treatment site.  The specific field applicator, shields, and devices both simple and complex as well as the specific patient setup is outlined below.  The patient was given a full consent for superficial radiation to both verbally and in writing and the full determination of patient's eligibility for treatment and selection is outlined on the patient eligibility and treatment selection form.  The specific superficial radiotherapy prescription was determined and was documented on the superficial radiotherapy prescription form.  A treatment calculation was also performed and documented on the treatment calculation form.  Based on the prescription, the patient was scheduled for a series of fractional treatments.
Dimensions-X Axis In Cm: 1.5
Detail Level: Detailed
Energy (Optional-Please Include Units) Rx 2: 50 kV
Total Dose (Optional-Please Include Units): 5155.8cGy
Custom Shielding Afterword Text Will Not Be Included With Simple Simulations (X X Y Cm............): port to correlate with the lesion size, including treatment margin. The custom lead shield is adequate to accommodate the appropriate applicator and provide adequate shielding around the treatment site. Additional shielding (as noted below) is used to protect sensitive, normal tissues.
Fractions / Week Rx 2: 4
Custom Shielding Preamble Text Will Not Be Included With Simple Simulations (.......... X X Y Cm): A lead shield of 0.762 mm thickness is utilized to form a molded, custom shield with a
Computed Treatment Time In Min (Will Render The Same As Calculated Treatment Time If Left Blank): per device
Daily Fractionated Dose (Optional- Include Units): 257.79cGy
Shielding Size (Optional- Include Units) Rx 2: 2.5 x 2.5
Total Number Of Fractions: 20
Fractions / Week: 3

## 2018-05-07 NOTE — PROCEDURE: TREATMENT REGIMEN
Detail Level: Zone
Plan: Per the request of Dr. Berry, Nallely Bhat was seen today for Superficial Radiation Therapy management.  A high frequency ultrasound image was acquired prior to treatment today for three dimensional evaluation of tumor volume and response to treatment, in addition, geometric accuracy of field placement (CPT®  ). Physician evaluation of the ultrasound tumor depth will be ongoing through course of treatment, and is deemed medically necessary ensuring efficacy of treatment. Today’s image and setup was evaluated determining continuation of treatment with the current plan, or necessary changes as appropriate. All appropriate custom blocking and treatment parameters verified by the Radiation therapist  according to initial simulation. \\n \\nPer Dr. Berry, continued daily US guidance and measurement of tumor depth, progress and edema monitoring.\\n\\nEvaluation for response and reaction to SRT based on current fraction and cumulative dose with a visual inspection and ultrasound demonstrates a normal expected response.  RTOG Acute Radiation Morbidity Score = 0.  Superficial Radiation Therapy will continue as planned.\\n\\nUS image guidance and field placement prior to treatment delivery performed.  \\nUS depth is 0.96mm ++,  TONIO 0.0

## 2018-05-09 ENCOUNTER — APPOINTMENT (OUTPATIENT)
Age: 83
Setting detail: DERMATOLOGY
End: 2018-05-09

## 2018-05-09 PROBLEM — C44.719 BASAL CELL CARCINOMA OF SKIN OF LEFT LOWER LIMB, INCLUDING HIP: Status: ACTIVE | Noted: 2018-05-09

## 2018-05-09 PROCEDURE — G6001 ECHO GUIDANCE RADIOTHERAPY: HCPCS

## 2018-05-09 PROCEDURE — 77280 THER RAD SIMULAJ FIELD SMPL: CPT

## 2018-05-09 PROCEDURE — OTHER TREATMENT REGIMEN: OTHER

## 2018-05-09 PROCEDURE — 77401 RADIATION TX DELIVERY SUPFC: CPT

## 2018-05-09 PROCEDURE — OTHER SUPERFICIAL RADIATION TREATMENT: OTHER

## 2018-05-09 PROCEDURE — OTHER FOLLOW UP FOR NEXT VISIT: OTHER

## 2018-05-09 NOTE — PROCEDURE: SUPERFICIAL RADIATION TREATMENT
Fractionation Number: 6
Total Dose (Optional-Please Include Units): 5155.8cGy
Detail Level: Detailed
Assessment: Appropriate reaction
Field Size (Applicator) Rx 2: 3.0 cm
Energy (Include Units): 70kV
Total Number Of Fractions: 20
Daily Fractionated Dose (Optional- Include Units) Rx 2: 255 cGy
Dimensions-Y Axis In Cm: 1.5
Energy (Optional-Please Include Units) Rx 2: 50 kV
Bill For Simulation And Treatment Device Design: No
Fractionation Number (Evaluation): 15
Custom Shielding Afterword Text Will Not Be Included With Simple Simulations (X X Y Cm............): port to correlate with the lesion size, including treatment margin. The custom lead shield is adequate to accommodate the appropriate applicator and provide adequate shielding around the treatment site. Additional shielding (as noted below) is used to protect sensitive, normal tissues.
Patient Positioning: Sitting
Fractions / Week: 3
Daily Fractionated Dose (Optional- Include Units): 257.79cGy
Number Of Treatment Days: 1
Port Dimensions-X Axis In Cm: 2.5
Computed Treatment Time In Min (Will Render The Same As Calculated Treatment Time If Left Blank): per device
Dose Per Fractionation In Cgy (Optional): 257.79
Shielding Size (Optional- Include Units) Rx 2: 2.5 x 2.5
Additional Prescription Justification Text: If there is any interruption in treatment exceeding 5 days please see Decay and Dose Adjustment Calculation and complete treatment under Prescription 2.
Custom Shielding Preamble Text Will Not Be Included With Simple Simulations (.......... X X Y Cm): A lead shield of 0.762 mm thickness is utilized to form a molded, custom shield with a
Simple Simulation Preamble Text Will Be Included With Simple Simulations (.......... Indications): Simple simulation was performed today for the following reasons:
Cumulative Dose In Cgy (Optional): 1546.74
Time Dose Fractionation (Optional- Include Units If Applicable): 89
Treatment Margins In Cm: 0.5
Bill For Radiation Treatment: Yes
Simple Simulation Afterword Text Will Be Included With Simple Simulations (Indications............): The patient had a complete consultation regarding all applicable modalities for the treatment of their skin cancer and based on a variety of factors including the type of tumor, size, and location, the relevant medical history as well as local tissue factors, the functional status of the individual, the ability to perform necessary postoperative wound instructions and the need for simultaneous treatments as well as overall wound healing status, it was determined that the patient would begin radiation therapy treatment for skin cancer.  A full simulation and treatment device design was performed including the determination and formulation of appropriate simple and complex devices including lead shield of 0.762 mm thickness to form molded customized shielding to specifically correlate with the lesion size including treatment margin.  The custom lead shield is adequate to accommodate the appropriate applicator and provide adequate shielding around the treatment site.  The specific field applicator, shields, and devices both simple and complex as well as the specific patient setup is outlined below.  The patient was given a full consent for superficial radiation to both verbally and in writing and the full determination of patient's eligibility for treatment and selection is outlined on the patient eligibility and treatment selection form.  The specific superficial radiotherapy prescription was determined and was documented on the superficial radiotherapy prescription form.  A treatment calculation was also performed and documented on the treatment calculation form.  Based on the prescription, the patient was scheduled for a series of fractional treatments.
Functional Status: 2 (ambulatory, performs self-care)
Fractions / Week Rx 3: 5
Treatment Time In Min (Optional): 0.39
Treatment Device Design After Initial Simulation Justification (Will Render If Bill For Treatment Devices = Yes): The patient is status post radiation simulation and is evaluated as to the use of additional devices for shielding and placement for radiation therapy.
Fractions / Week Rx 2: 4
Day Of The Week Treatment Administered: Wednesday

## 2018-05-09 NOTE — PROCEDURE: TREATMENT REGIMEN
Plan: Per the request of Dr. Berry, Nallely Lopes was seen today for Superficial Radiation Therapy requiring simulation (CPT® 74256) in preparation for treatment of specific diseased site(s). Simulation is necessary to determine correct patient and treatment portal positioning, deliver safe and effective radiation therapy. A high frequency ultrasound image was acquired prior to treatment today for three dimensional evaluation of tumor volume and response to treatment, in addition, geometric accuracy of field placement (CPT®  ). Physician evaluation of the ultrasound tumor depth will be ongoing through course of treatment, and is deemed medically necessary ensuring efficacy of treatment. Today’s image and setup was evaluated determining continuation of treatment with the current plan, or necessary changes as appropriate. All appropriate custom blocking and treatment parameters verified by the radiation therapist according to initial simulation. \\n\\nPer Dr. Berry, continued daily US guidance and simulation is required for field placement, measurement of tumor depth, progress and edema monitoring.\\n\\nEvaluation prior to treatment for response and reaction to SRT based on current fraction and cumulative dose with a visual inspection and ultrasound demonstrates a normal expected response.  RTOG Acute Radiation Morbidity Score = 2.  Superficial Radiation Therapy will continue as planned.\\n\\nUS image guidance and field placement prior to treatment delivery performed.  \\nUS depth is 0.71mm+ ++,  TONIO 0.0
Detail Level: Zone

## 2018-05-11 ENCOUNTER — APPOINTMENT (OUTPATIENT)
Age: 83
Setting detail: DERMATOLOGY
End: 2018-05-14

## 2018-05-11 PROBLEM — C44.719 BASAL CELL CARCINOMA OF SKIN OF LEFT LOWER LIMB, INCLUDING HIP: Status: ACTIVE | Noted: 2018-05-11

## 2018-05-11 PROCEDURE — OTHER SUPERFICIAL RADIATION TREATMENT: OTHER

## 2018-05-11 PROCEDURE — 77401 RADIATION TX DELIVERY SUPFC: CPT

## 2018-05-11 PROCEDURE — OTHER FOLLOW UP FOR NEXT VISIT: OTHER

## 2018-05-11 PROCEDURE — OTHER TREATMENT REGIMEN: OTHER

## 2018-05-11 PROCEDURE — G6001 ECHO GUIDANCE RADIOTHERAPY: HCPCS

## 2018-05-11 PROCEDURE — 77280 THER RAD SIMULAJ FIELD SMPL: CPT

## 2018-05-11 NOTE — PROCEDURE: SUPERFICIAL RADIATION TREATMENT
Include Rx 2 When Rendering Additional Prescriptions: No
Custom Shielding Afterword Text Will Not Be Included With Simple Simulations (X X Y Cm............): port to correlate with the lesion size, including treatment margin. The custom lead shield is adequate to accommodate the appropriate applicator and provide adequate shielding around the treatment site. Additional shielding (as noted below) is used to protect sensitive, normal tissues.
Initial Radiation Treatment Planning (Will Render If Bill Simulation = Yes): The patient had a complete consultation regarding all applicable modalities for the treatment of their skin cancer and based on a variety of factors including the type of tumor, size, and location, the relevant medical history as well as local tissue factors, the functional status of the individual, the ability to perform necessary postoperative wound instructions and the need for simultaneous treatments as well as overall wound healing status, it was determined that the patient would begin radiation therapy treatment for skin cancer.  A full simulation and treatment device design was performed including the determination and formulation of appropriate simple and complex devices including lead shield of 0.762 mm thickness to form molded customized shielding to specifically correlate with the lesion size including treatment margin.  The custom lead shield is adequate to accommodate the appropriate applicator and provide adequate shielding around the treatment site.  The specific field applicator, shields, and devices both simple and complex as well as the specific patient setup is outlined below.  The patient was given a full consent for superficial radiation to both verbally and in writing and the full determination of patient's eligibility for treatment and selection is outlined on the patient eligibility and treatment selection form.  The specific superficial radiotherapy prescription was determined and was documented on the superficial radiotherapy prescription form.  A treatment calculation was also performed and documented on the treatment calculation form.  Based on the prescription, the patient was scheduled for a series of fractional treatments.
Number Of Treatment Days: 1
Total Number Of Fractions Rx 2: 15
Energy (Optional-Please Include Units): 70kV
Treatment Time / Fractionation (Optional- Include Units): 0.39
Cumulative Dose In Cgy (Optional): 1797.92
Functional Status: 2 (ambulatory, performs self-care)
Bill For Radiation Treatment: Yes
Dose Per Fractionation In Cgy (Optional): 257.79
Dimensions-Y Axis In Cm: 1.5
Daily Fractionated Dose (Optional- Include Units) Rx 2: 255 cGy
Treatment Device Design After Initial Simulation Justification (Will Render If Bill For Treatment Devices = Yes): The patient is status post radiation simulation and is evaluated as to the use of additional devices for shielding and placement for radiation therapy.
Shielding Size (Optional- Include Units): 2.5 X 2.5
Assessment: Appropriate reaction
Treatment Margins In Cm: 0.5
Fractions / Week: 3
Field Size (Applicator) Rx 2: 3.0 cm
Detail Level: Detailed
Total Number Of Fractions: 20
Computed Treatment Time In Min (Will Render The Same As Calculated Treatment Time If Left Blank): per device
Dose / Tx In Cgy (Optional): 251.18
Fractions / Week Rx 4: 5
Energy (Optional-Please Include Units) Rx 2: 50 kV
Treatment Time In Min (Optional): 0.38
Port Dimensions-X Axis In Cm: 2.5
Total Dose (Optional-Please Include Units): 5155.8cGy
Patient Positioning: Sitting
Daily Fractionated Dose (Optional- Include Units): 257.79cGy
Fractionation Number: 7
Additional Prescription Justification Text: If there is any interruption in treatment exceeding 5 days please see Decay and Dose Adjustment Calculation and complete treatment under Prescription 2.
Simple Simulation Preamble Text Will Be Included With Simple Simulations (.......... Indications): Simple simulation was performed today for the following reasons:
Day Of The Week Treatment Administered: Friday
Time Dose Fractionation (Optional- Include Units If Applicable): 89
Custom Shielding Preamble Text Will Not Be Included With Simple Simulations (.......... X X Y Cm): A lead shield of 0.762 mm thickness is utilized to form a molded, custom shield with a
Fractions / Week Rx 2: 4

## 2018-05-11 NOTE — PROCEDURE: TREATMENT REGIMEN
Plan: Per the request of Dr. Berry, Nallely Lopes was seen today for Superficial Radiation Therapy requiring simulation (CPT® 43360) in preparation for treatment of specific diseased site(s). Simulation is necessary to determine correct patient and treatment portal positioning, deliver safe and effective radiation therapy. A high frequency ultrasound image was acquired prior to treatment today for three dimensional evaluation of tumor volume and response to treatment, in addition, geometric accuracy of field placement (CPT®  ). Physician evaluation of the ultrasound tumor depth will be ongoing through course of treatment, and is deemed medically necessary ensuring efficacy of treatment. Today’s image and setup was evaluated determining continuation of treatment with the current plan, or necessary changes as appropriate. All appropriate custom blocking and treatment parameters verified by the radiation therapist according to initial simulation. \\n\\nPer Dr. Berry, continued daily US guidance and simulation is required for field placement, measurement of tumor depth, progress and edema monitoring.\\n\\nEvaluation prior to treatment for response and reaction to SRT based on current fraction and cumulative dose with a visual inspection and ultrasound demonstrates a normal expected response.  RTOG Acute Radiation Morbidity Score = 2.  Superficial Radiation Therapy will continue as planned.\\n\\nUS image guidance and field placement prior to treatment delivery performed.  \\nUS depth is 0.60mm+ ++,  TONIO 0.0
Detail Level: Zone

## 2018-05-14 ENCOUNTER — APPOINTMENT (OUTPATIENT)
Age: 83
Setting detail: DERMATOLOGY
End: 2018-05-14

## 2018-05-14 PROBLEM — C44.719 BASAL CELL CARCINOMA OF SKIN OF LEFT LOWER LIMB, INCLUDING HIP: Status: ACTIVE | Noted: 2018-05-14

## 2018-05-14 PROCEDURE — 77280 THER RAD SIMULAJ FIELD SMPL: CPT

## 2018-05-14 PROCEDURE — OTHER SUPERFICIAL RADIATION TREATMENT: OTHER

## 2018-05-14 PROCEDURE — G6001 ECHO GUIDANCE RADIOTHERAPY: HCPCS

## 2018-05-14 PROCEDURE — 77401 RADIATION TX DELIVERY SUPFC: CPT

## 2018-05-14 PROCEDURE — OTHER TREATMENT REGIMEN: OTHER

## 2018-05-14 PROCEDURE — OTHER FOLLOW UP FOR NEXT VISIT: OTHER

## 2018-05-14 NOTE — PROCEDURE: SUPERFICIAL RADIATION TREATMENT
Render Patient Eligibility And Selection In Note?: No
Total Number Of Fractions: 20
Total Number Of Fractions Rx 2: 15
Dimensions-Y Axis In Cm: 1.5
Daily Fractionated Dose (Optional- Include Units) Rx 2: 255 cGy
Treatment Time In Min (Optional): 0.38
Number Of Days Off Treatment: 1
Treatment Margins In Cm: 0.5
Field Size (Applicator): 3.0 cm
Detail Level: Detailed
Computed Treatment Time In Min (Will Render The Same As Calculated Treatment Time If Left Blank): per device
Additional Prescription Justification Text: If there is any interruption in treatment exceeding 5 days please see Decay and Dose Adjustment Calculation and complete treatment under Prescription 2.
Treatment Time / Fractionation (Optional- Include Units): 0.39
Custom Shielding Afterword Text Will Not Be Included With Simple Simulations (X X Y Cm............): port to correlate with the lesion size, including treatment margin. The custom lead shield is adequate to accommodate the appropriate applicator and provide adequate shielding around the treatment site. Additional shielding (as noted below) is used to protect sensitive, normal tissues.
Fractions / Week Rx 2: 4
Energy (Optional-Please Include Units): 70kV
Port Dimensions-X Axis In Cm: 2.5
Assessment: Appropriate reaction
Dose / Tx In Cgy (Optional): 251.18
Simple Simulation Preamble Text Will Be Included With Simple Simulations (.......... Indications): Simple simulation was performed today for the following reasons:
Fractions / Week Rx 4: 5
Patient Positioning: Sitting
Energy (Optional-Please Include Units) Rx 2: 50 kV
Shielding Size (Optional- Include Units): 2.5 X 2.5
Time Dose Fractionation (Optional- Include Units If Applicable): 89
Fractionation Number: 8
Treatment Device Design After Initial Simulation Justification (Will Render If Bill For Treatment Devices = Yes): The patient is status post radiation simulation and is evaluated as to the use of additional devices for shielding and placement for radiation therapy.
Daily Fractionated Dose (Optional- Include Units): 257.79cGy
Bill For Radiation Treatment: Yes
Dose Per Fractionation In Cgy (Optional): 257.79
Cumulative Dose In Cgy (Optional): 2049.1
Fractions / Week: 3
Initial Radiation Treatment Planning (Will Render If Bill Simulation = Yes): The patient had a complete consultation regarding all applicable modalities for the treatment of their skin cancer and based on a variety of factors including the type of tumor, size, and location, the relevant medical history as well as local tissue factors, the functional status of the individual, the ability to perform necessary postoperative wound instructions and the need for simultaneous treatments as well as overall wound healing status, it was determined that the patient would begin radiation therapy treatment for skin cancer.  A full simulation and treatment device design was performed including the determination and formulation of appropriate simple and complex devices including lead shield of 0.762 mm thickness to form molded customized shielding to specifically correlate with the lesion size including treatment margin.  The custom lead shield is adequate to accommodate the appropriate applicator and provide adequate shielding around the treatment site.  The specific field applicator, shields, and devices both simple and complex as well as the specific patient setup is outlined below.  The patient was given a full consent for superficial radiation to both verbally and in writing and the full determination of patient's eligibility for treatment and selection is outlined on the patient eligibility and treatment selection form.  The specific superficial radiotherapy prescription was determined and was documented on the superficial radiotherapy prescription form.  A treatment calculation was also performed and documented on the treatment calculation form.  Based on the prescription, the patient was scheduled for a series of fractional treatments.
Functional Status: 2 (ambulatory, performs self-care)
Custom Shielding Preamble Text Will Not Be Included With Simple Simulations (.......... X X Y Cm): A lead shield of 0.762 mm thickness is utilized to form a molded, custom shield with a
Day Of The Week Treatment Administered: Monday
Total Dose (Optional-Please Include Units): 5155.8cGy

## 2018-05-14 NOTE — PROCEDURE: TREATMENT REGIMEN
Samples Given: Aquaphor
Plan: Per the request of Dr. Berry, Nallely Lopes was seen today for Superficial Radiation Therapy requiring simulation (CPT® 49450) in preparation for treatment of specific diseased site(s). Simulation is necessary to determine correct patient and treatment portal positioning, deliver safe and effective radiation therapy. A high frequency ultrasound image was acquired prior to treatment today for three dimensional evaluation of tumor volume and response to treatment, in addition, geometric accuracy of field placement (CPT®  ). Physician evaluation of the ultrasound tumor depth will be ongoing through course of treatment, and is deemed medically necessary ensuring efficacy of treatment. Today’s image and setup was evaluated determining continuation of treatment with the current plan, or necessary changes as appropriate. All appropriate custom blocking and treatment parameters verified by the radiation therapist according to initial simulation. \\n\\nPer Dr. Berry, continued daily US guidance and simulation is required for field placement, measurement of tumor depth, progress and edema monitoring.\\n\\nEvaluation prior to treatment for response and reaction to SRT based on current fraction and cumulative dose with a visual inspection and ultrasound demonstrates a normal expected response.  RTOG Acute Radiation Morbidity Score = 2.  Superficial Radiation Therapy will continue as planned.\\n\\nUS image guidance and field placement prior to treatment delivery performed.  \\nUS depth is 0.64mm+ ++,  TONIO 0.295mm^2
Detail Level: Zone

## 2018-05-16 ENCOUNTER — APPOINTMENT (OUTPATIENT)
Age: 83
Setting detail: DERMATOLOGY
End: 2018-05-16

## 2018-05-16 PROBLEM — C44.719 BASAL CELL CARCINOMA OF SKIN OF LEFT LOWER LIMB, INCLUDING HIP: Status: ACTIVE | Noted: 2018-05-16

## 2018-05-16 PROCEDURE — 77280 THER RAD SIMULAJ FIELD SMPL: CPT

## 2018-05-16 PROCEDURE — G6001 ECHO GUIDANCE RADIOTHERAPY: HCPCS

## 2018-05-16 PROCEDURE — OTHER SUPERFICIAL RADIATION TREATMENT: OTHER

## 2018-05-16 PROCEDURE — OTHER TREATMENT REGIMEN: OTHER

## 2018-05-16 PROCEDURE — 77401 RADIATION TX DELIVERY SUPFC: CPT

## 2018-05-16 PROCEDURE — OTHER FOLLOW UP FOR NEXT VISIT: OTHER

## 2018-05-16 NOTE — PROCEDURE: SUPERFICIAL RADIATION TREATMENT
Total Number Of Fractions Rx 3: 15
Custom Shielding Afterword Text Will Not Be Included With Simple Simulations (X X Y Cm............): port to correlate with the lesion size, including treatment margin. The custom lead shield is adequate to accommodate the appropriate applicator and provide adequate shielding around the treatment site. Additional shielding (as noted below) is used to protect sensitive, normal tissues.
Bill And Render Text From Evaluation And Management Tab (Will Bill [id number]): No
Bill For Radiation Treatment: Yes
Functional Status: 2 (ambulatory, performs self-care)
Prescription Used: 1
Port Dimensions-Y Axis In Cm: 2.5
Shielding Size (Optional- Include Units) Rx 2: 2.5 x 2.5
Initial Radiation Treatment Planning (Will Render If Bill Simulation = Yes): The patient had a complete consultation regarding all applicable modalities for the treatment of their skin cancer and based on a variety of factors including the type of tumor, size, and location, the relevant medical history as well as local tissue factors, the functional status of the individual, the ability to perform necessary postoperative wound instructions and the need for simultaneous treatments as well as overall wound healing status, it was determined that the patient would begin radiation therapy treatment for skin cancer.  A full simulation and treatment device design was performed including the determination and formulation of appropriate simple and complex devices including lead shield of 0.762 mm thickness to form molded customized shielding to specifically correlate with the lesion size including treatment margin.  The custom lead shield is adequate to accommodate the appropriate applicator and provide adequate shielding around the treatment site.  The specific field applicator, shields, and devices both simple and complex as well as the specific patient setup is outlined below.  The patient was given a full consent for superficial radiation to both verbally and in writing and the full determination of patient's eligibility for treatment and selection is outlined on the patient eligibility and treatment selection form.  The specific superficial radiotherapy prescription was determined and was documented on the superficial radiotherapy prescription form.  A treatment calculation was also performed and documented on the treatment calculation form.  Based on the prescription, the patient was scheduled for a series of fractional treatments.
Treatment Time / Fractionation (Optional- Include Units): 0.39
Dose / Tx In Cgy (Optional): 257.79
Field Size (Applicator) Rx 2: 3.0 cm
Cumulative Dose In Cgy (Optional): 2306.89
Fractions / Week Rx 4: 5
Energy (Optional-Please Include Units): 70kV
Time Dose Fractionation (Optional- Include Units If Applicable): 89
Day Of The Week Treatment Administered: Wednesday
Fractions / Week Rx 2: 4
Energy Output In Cgy/Min (Optional): .39
Assessment: Appropriate reaction
Dimensions-X Axis In Cm: 1.5
Custom Shielding Preamble Text Will Not Be Included With Simple Simulations (.......... X X Y Cm): A lead shield of 0.762 mm thickness is utilized to form a molded, custom shield with a
Additional Prescription Justification Text: If there is any interruption in treatment exceeding 5 days please see Decay and Dose Adjustment Calculation and complete treatment under Prescription 2.
Fractionation Number: 9
Computed Treatment Time In Min (Will Render The Same As Calculated Treatment Time If Left Blank): per device
Simple Simulation Preamble Text Will Be Included With Simple Simulations (.......... Indications): Simple simulation was performed today for the following reasons:
Energy (Optional-Please Include Units) Rx 2: 50 kV
Total Dose (Optional-Please Include Units): 5155.8cGy
Daily Fractionated Dose (Optional- Include Units): 257.79cGy
Fractions / Week: 3
Daily Fractionated Dose (Optional- Include Units) Rx 2: 255 cGy
Treatment Time In Min (Optional): 0.38
Detail Level: Detailed
Treatment Margins In Cm: 0.5
Total Number Of Fractions: 20
Treatment Device Design After Initial Simulation Justification (Will Render If Bill For Treatment Devices = Yes): The patient is status post radiation simulation and is evaluated as to the use of additional devices for shielding and placement for radiation therapy.
Patient Positioning: Sitting

## 2018-05-16 NOTE — PROCEDURE: TREATMENT REGIMEN
Plan: Per the request of Dr. Berry, Nallely Lopes was seen today for Superficial Radiation Therapy requiring simulation (CPT® 99600) in preparation for treatment of specific diseased site(s). Simulation is necessary to determine correct patient and treatment portal positioning, deliver safe and effective radiation therapy. A high frequency ultrasound image was acquired prior to treatment today for three dimensional evaluation of tumor volume and response to treatment, in addition, geometric accuracy of field placement (CPT®  ). Physician evaluation of the ultrasound tumor depth will be ongoing through course of treatment, and is deemed medically necessary ensuring efficacy of treatment. Today’s image and setup was evaluated determining continuation of treatment with the current plan, or necessary changes as appropriate. All appropriate custom blocking and treatment parameters verified by the radiation therapist according to initial simulation. \\n\\nPer Dr. Berry, continued daily US guidance and simulation is required for field placement, measurement of tumor depth, progress and edema monitoring.\\n\\nEvaluation prior to treatment for response and reaction to SRT based on current fraction and cumulative dose with a visual inspection and ultrasound demonstrates a normal expected response.  RTOG Acute Radiation Morbidity Score = 2.  Superficial Radiation Therapy will continue as planned.\\n\\nUS image guidance and field placement prior to treatment delivery performed.  \\nUS depth is 0.84mm ++,  TONIO 0.0mm^2
Detail Level: Zone
Samples Given: Aquaphor

## 2018-05-18 ENCOUNTER — APPOINTMENT (OUTPATIENT)
Age: 83
Setting detail: DERMATOLOGY
End: 2018-05-21

## 2018-05-18 PROBLEM — C44.719 BASAL CELL CARCINOMA OF SKIN OF LEFT LOWER LIMB, INCLUDING HIP: Status: ACTIVE | Noted: 2018-05-18

## 2018-05-18 PROCEDURE — 77401 RADIATION TX DELIVERY SUPFC: CPT

## 2018-05-18 PROCEDURE — OTHER SUPERFICIAL RADIATION TREATMENT: OTHER

## 2018-05-18 PROCEDURE — G6001 ECHO GUIDANCE RADIOTHERAPY: HCPCS

## 2018-05-18 PROCEDURE — OTHER FOLLOW UP FOR NEXT VISIT: OTHER

## 2018-05-18 PROCEDURE — OTHER TREATMENT REGIMEN: OTHER

## 2018-05-18 PROCEDURE — 77280 THER RAD SIMULAJ FIELD SMPL: CPT

## 2018-05-18 PROCEDURE — 77427 RADIATION TX MANAGEMENT X5: CPT | Mod: 25

## 2018-05-18 NOTE — PROCEDURE: TREATMENT REGIMEN
Detail Level: Zone
Samples Given: Aquaphor
Plan: Per the request of Marianne Desir Betty was seen today for Superficial Radiation Therapy management.  A high frequency ultrasound image was acquired prior to treatment today for three dimensional evaluation of tumor volume and response to treatment, in addition, geometric accuracy of field placement (CPT®  ). Physician evaluation of the ultrasound tumor depth will be ongoing through course of treatment, and is deemed medically necessary ensuring efficacy of treatment. Today’s image and setup was evaluated determining continuation of treatment with the current plan, or necessary changes as appropriate. All appropriate custom blocking and treatment parameters verified by the Radiation therapist  according to initial simulation. \\n \\nPer Dr. Berry, continued daily US guidance and measurement of tumor depth, progress and edema monitoring.\\n\\nEvaluation for response and reaction to SRT based on current fraction and cumulative dose with a visual inspection and ultrasound demonstrates a normal expected response.  RTOG Acute Radiation Morbidity Score = 2.  Superficial Radiation Therapy will continue as planned.\\n\\nUS image guidance and field placement prior to treatment delivery performed.  \\nUS depth is 0.97mm +  TONIO 5.76mm^2

## 2018-05-18 NOTE — PROCEDURE: SUPERFICIAL RADIATION TREATMENT
Computed Treatment Time In Min (Will Render The Same As Calculated Treatment Time If Left Blank): per device
Total Number Of Fractions Rx 2: 15
Daily Fractionated Dose (Optional- Include Units): 257.79cGy
Shielding Size (Optional- Include Units) Rx 2: 2.5 x 2.5
Bill For Simulation And Treatment Device Design: No
Simple Simulation Preamble Text Will Be Included With Simple Simulations (.......... Indications): Simple simulation was performed today for the following reasons:
Total Dose (Optional-Please Include Units): 5155.8cGy
Prescription Used: 1
Energy (Optional-Please Include Units) Rx 2: 50 kV
Time Dose Fractionation (Optional- Include Units If Applicable): 89
Fractions / Week Rx 4: 5
Dimensions-X Axis In Cm: 1.5
Treatment Device Design After Initial Simulation Justification (Will Render If Bill For Treatment Devices = Yes): The patient is status post radiation simulation and is evaluated as to the use of additional devices for shielding and placement for radiation therapy.
Detail Level: Detailed
Bill And Render Text From Evaluation And Management Tab (Will Bill [id number]): Yes
Assessment: Appropriate reaction
Dose Per Fractionation In Cgy (Optional): 257.79
Cumulative Dose In Cgy (Optional): 2564.68
Day Of The Week Treatment Administered: Friday
Fractionation Number: 10
Energy (Include Units): 70kV
Functional Status: 2 (ambulatory, performs self-care)
Additional Prescription Justification Text: If there is any interruption in treatment exceeding 5 days please see Decay and Dose Adjustment Calculation and complete treatment under Prescription 2.
Fractions / Week: 3
Field Size (Applicator): 3.0 cm
Patient Positioning: Sitting
Port Dimensions-X Axis In Cm: 2.5
Simple Simulation Afterword Text Will Be Included With Simple Simulations (Indications............): The patient had a complete consultation regarding all applicable modalities for the treatment of their skin cancer and based on a variety of factors including the type of tumor, size, and location, the relevant medical history as well as local tissue factors, the functional status of the individual, the ability to perform necessary postoperative wound instructions and the need for simultaneous treatments as well as overall wound healing status, it was determined that the patient would begin radiation therapy treatment for skin cancer.  A full simulation and treatment device design was performed including the determination and formulation of appropriate simple and complex devices including lead shield of 0.762 mm thickness to form molded customized shielding to specifically correlate with the lesion size including treatment margin.  The custom lead shield is adequate to accommodate the appropriate applicator and provide adequate shielding around the treatment site.  The specific field applicator, shields, and devices both simple and complex as well as the specific patient setup is outlined below.  The patient was given a full consent for superficial radiation to both verbally and in writing and the full determination of patient's eligibility for treatment and selection is outlined on the patient eligibility and treatment selection form.  The specific superficial radiotherapy prescription was determined and was documented on the superficial radiotherapy prescription form.  A treatment calculation was also performed and documented on the treatment calculation form.  Based on the prescription, the patient was scheduled for a series of fractional treatments.
Daily Fractionated Dose (Optional- Include Units) Rx 2: 255 cGy
Custom Shielding Afterword Text Will Not Be Included With Simple Simulations (X X Y Cm............): port to correlate with the lesion size, including treatment margin. The custom lead shield is adequate to accommodate the appropriate applicator and provide adequate shielding around the treatment site. Additional shielding (as noted below) is used to protect sensitive, normal tissues.
Custom Shielding Preamble Text Will Not Be Included With Simple Simulations (.......... X X Y Cm): A lead shield of 0.762 mm thickness is utilized to form a molded, custom shield with a
Total Number Of Fractions: 20
Energy Output In Cgy/Min (Optional): .39
Treatment Time / Fractionation (Optional- Include Units): 0.39
Treatment Margins In Cm: 0.5
Fractions / Week Rx 2: 4
Treatment Time In Min (Optional): 0.38

## 2018-05-21 ENCOUNTER — APPOINTMENT (OUTPATIENT)
Age: 83
Setting detail: DERMATOLOGY
End: 2018-05-21

## 2018-05-21 PROBLEM — C44.719 BASAL CELL CARCINOMA OF SKIN OF LEFT LOWER LIMB, INCLUDING HIP: Status: ACTIVE | Noted: 2018-05-21

## 2018-05-21 PROCEDURE — OTHER FOLLOW UP FOR NEXT VISIT: OTHER

## 2018-05-21 PROCEDURE — 77401 RADIATION TX DELIVERY SUPFC: CPT

## 2018-05-21 PROCEDURE — OTHER TREATMENT REGIMEN: OTHER

## 2018-05-21 PROCEDURE — G6001 ECHO GUIDANCE RADIOTHERAPY: HCPCS

## 2018-05-21 PROCEDURE — OTHER SUPERFICIAL RADIATION TREATMENT: OTHER

## 2018-05-21 PROCEDURE — 77280 THER RAD SIMULAJ FIELD SMPL: CPT

## 2018-05-21 NOTE — PROCEDURE: TREATMENT REGIMEN
Plan: Per the request of Marianne Desir Nallely was seen today for Superficial Radiation Therapy management.  A high frequency ultrasound image was acquired prior to treatment today for three dimensional evaluation of tumor volume and response to treatment, in addition, geometric accuracy of field placement (CPT®  ). Physician evaluation of the ultrasound tumor depth will be ongoing through course of treatment, and is deemed medically necessary ensuring efficacy of treatment. Today’s image and setup was evaluated determining continuation of treatment with the current plan, or necessary changes as appropriate. All appropriate custom blocking and treatment parameters verified by the Radiation therapist  according to initial simulation. \\n \\nPer Dr. Berry, continued daily US guidance and measurement of tumor depth, progress and edema monitoring.\\n\\nEvaluation for response and reaction to SRT based on current fraction and cumulative dose with a visual inspection and ultrasound demonstrates a normal expected response.  RTOG Acute Radiation Morbidity Score = 2.  Superficial Radiation Therapy will continue as planned.\\n\\nUS image guidance and field placement prior to treatment delivery performed.  \\nUS depth is 1.00mm, Repop ++,  TONIO 4.117mm sq
Detail Level: Zone
Samples Given: Aquaphor

## 2018-05-21 NOTE — PROCEDURE: SUPERFICIAL RADIATION TREATMENT
Total Number Of Fractions: 20
Field Size (Applicator): 3.0 cm
Assessment: Appropriate reaction
Render Prescriptions In Note?: No
Fractions / Week Rx 4: 5
Computed Treatment Time In Min (Will Render The Same As Calculated Treatment Time If Left Blank): per device
Treatment Margins In Cm: 0.5
Shielding Size (Optional- Include Units): 2.5 X 2.5
Additional Prescription Justification Text: If there is any interruption in treatment exceeding 5 days please see Decay and Dose Adjustment Calculation and complete treatment under Prescription 2.
Daily Fractionated Dose (Optional- Include Units) Rx 2: 255 cGy
Energy (Include Units): 70kV
Bill For Radiation Treatment: Yes
Simple Simulation Afterword Text Will Be Included With Simple Simulations (Indications............): The patient had a complete consultation regarding all applicable modalities for the treatment of their skin cancer and based on a variety of factors including the type of tumor, size, and location, the relevant medical history as well as local tissue factors, the functional status of the individual, the ability to perform necessary postoperative wound instructions and the need for simultaneous treatments as well as overall wound healing status, it was determined that the patient would begin radiation therapy treatment for skin cancer.  A full simulation and treatment device design was performed including the determination and formulation of appropriate simple and complex devices including lead shield of 0.762 mm thickness to form molded customized shielding to specifically correlate with the lesion size including treatment margin.  The custom lead shield is adequate to accommodate the appropriate applicator and provide adequate shielding around the treatment site.  The specific field applicator, shields, and devices both simple and complex as well as the specific patient setup is outlined below.  The patient was given a full consent for superficial radiation to both verbally and in writing and the full determination of patient's eligibility for treatment and selection is outlined on the patient eligibility and treatment selection form.  The specific superficial radiotherapy prescription was determined and was documented on the superficial radiotherapy prescription form.  A treatment calculation was also performed and documented on the treatment calculation form.  Based on the prescription, the patient was scheduled for a series of fractional treatments.
Port Dimensions-Y Axis In Cm: 2.5
Simple Simulation Preamble Text Will Be Included With Simple Simulations (.......... Indications): Simple simulation was performed today for the following reasons:
Fractionation Number: 11
Cumulative Dose In Cgy (Optional): 2822.47
Patient Positioning: Sitting
Dimensions-X Axis In Cm: 1.5
Detail Level: Detailed
Number Of Days Off Treatment: 1
Custom Shielding Preamble Text Will Not Be Included With Simple Simulations (.......... X X Y Cm): A lead shield of 0.762 mm thickness is utilized to form a molded, custom shield with a
Daily Fractionated Dose (Optional- Include Units): 257.79cGy
Dose / Tx In Cgy (Optional): 257.79
Total Number Of Fractions Rx 4: 15
Fractions / Week Rx 2: 4
Treatment Time / Fractionation (Optional- Include Units): 0.39
Time Dose Fractionation (Optional- Include Units If Applicable): 89
Energy Output In Cgy/Min (Optional): .39
Custom Shielding Afterword Text Will Not Be Included With Simple Simulations (X X Y Cm............): port to correlate with the lesion size, including treatment margin. The custom lead shield is adequate to accommodate the appropriate applicator and provide adequate shielding around the treatment site. Additional shielding (as noted below) is used to protect sensitive, normal tissues.
Functional Status: 2 (ambulatory, performs self-care)
Day Of The Week Treatment Administered: Monday
Total Dose (Optional-Please Include Units): 5155.8cGy
Treatment Device Design After Initial Simulation Justification (Will Render If Bill For Treatment Devices = Yes): The patient is status post radiation simulation and is evaluated as to the use of additional devices for shielding and placement for radiation therapy.
Energy (Optional-Please Include Units) Rx 2: 50 kV
Fractions / Week: 3

## 2018-05-23 ENCOUNTER — APPOINTMENT (OUTPATIENT)
Age: 83
Setting detail: DERMATOLOGY
End: 2018-05-23

## 2018-05-23 PROBLEM — C44.719 BASAL CELL CARCINOMA OF SKIN OF LEFT LOWER LIMB, INCLUDING HIP: Status: ACTIVE | Noted: 2018-05-23

## 2018-05-23 PROCEDURE — G6001 ECHO GUIDANCE RADIOTHERAPY: HCPCS

## 2018-05-23 PROCEDURE — OTHER SUPERFICIAL RADIATION TREATMENT: OTHER

## 2018-05-23 PROCEDURE — OTHER FOLLOW UP FOR NEXT VISIT: OTHER

## 2018-05-23 PROCEDURE — OTHER TREATMENT REGIMEN: OTHER

## 2018-05-23 PROCEDURE — 77401 RADIATION TX DELIVERY SUPFC: CPT

## 2018-05-23 PROCEDURE — 77280 THER RAD SIMULAJ FIELD SMPL: CPT

## 2018-05-23 NOTE — PROCEDURE: TREATMENT REGIMEN
Detail Level: Zone
Samples Given: Aquaphor
Plan: Per the request of Marianne Desir Betty was seen today for Superficial Radiation Therapy management.  A high frequency ultrasound image was acquired prior to treatment today for three dimensional evaluation of tumor volume and response to treatment, in addition, geometric accuracy of field placement (CPT®  ). Physician evaluation of the ultrasound tumor depth will be ongoing through course of treatment, and is deemed medically necessary ensuring efficacy of treatment. Today’s image and setup was evaluated determining continuation of treatment with the current plan, or necessary changes as appropriate. All appropriate custom blocking and treatment parameters verified by the Radiation therapist  according to initial simulation. \\n \\nPer Dr. Berry, continued daily US guidance and measurement of tumor depth, progress and edema monitoring.\\n\\nEvaluation for response and reaction to SRT based on current fraction and cumulative dose with a visual inspection and ultrasound demonstrates a normal expected response.  RTOG Acute Radiation Morbidity Score = 2.  Superficial Radiation Therapy will continue as planned.\\n\\nUS image guidance and field placement prior to treatment delivery performed.  \\nUS depth is 0.87mm, Repop ++,  TONIO 2.079mm sq

## 2018-05-23 NOTE — PROCEDURE: SUPERFICIAL RADIATION TREATMENT
Assessment: Appropriate reaction
Bill For Radiation Treatment: Yes
Detail Level: Detailed
Field Size (Applicator): 3.0 cm
Total Number Of Fractions Rx 4: 15
Simple Simulation Afterword Text Will Be Included With Simple Simulations (Indications............): The patient had a complete consultation regarding all applicable modalities for the treatment of their skin cancer and based on a variety of factors including the type of tumor, size, and location, the relevant medical history as well as local tissue factors, the functional status of the individual, the ability to perform necessary postoperative wound instructions and the need for simultaneous treatments as well as overall wound healing status, it was determined that the patient would begin radiation therapy treatment for skin cancer.  A full simulation and treatment device design was performed including the determination and formulation of appropriate simple and complex devices including lead shield of 0.762 mm thickness to form molded customized shielding to specifically correlate with the lesion size including treatment margin.  The custom lead shield is adequate to accommodate the appropriate applicator and provide adequate shielding around the treatment site.  The specific field applicator, shields, and devices both simple and complex as well as the specific patient setup is outlined below.  The patient was given a full consent for superficial radiation to both verbally and in writing and the full determination of patient's eligibility for treatment and selection is outlined on the patient eligibility and treatment selection form.  The specific superficial radiotherapy prescription was determined and was documented on the superficial radiotherapy prescription form.  A treatment calculation was also performed and documented on the treatment calculation form.  Based on the prescription, the patient was scheduled for a series of fractional treatments.
Fractions / Week Rx 3: 5
Render Text From Evaluation And Management Tab (Will Not Bill 12838): No
Treatment Device Design After Initial Simulation Justification (Will Render If Bill For Treatment Devices = Yes): The patient is status post radiation simulation and is evaluated as to the use of additional devices for shielding and placement for radiation therapy.
Energy (Optional-Please Include Units) Rx 2: 50 kV
Simple Simulation Preamble Text Will Be Included With Simple Simulations (.......... Indications): Simple simulation was performed today for the following reasons:
Shielding Size (Optional- Include Units): 2.5 X 2.5
Custom Shielding Afterword Text Will Not Be Included With Simple Simulations (X X Y Cm............): port to correlate with the lesion size, including treatment margin. The custom lead shield is adequate to accommodate the appropriate applicator and provide adequate shielding around the treatment site. Additional shielding (as noted below) is used to protect sensitive, normal tissues.
Dose Per Fractionation In Cgy (Optional): 257.79
Fractionation Number: 12
Energy Output In Cgy/Min (Optional): .39
Day Of The Week Treatment Administered: Wednesday
Computed Treatment Time In Min (Will Render The Same As Calculated Treatment Time If Left Blank): per device
Patient Positioning: Sitting
Energy (Optional-Please Include Units): 70kV
Cumulative Dose In Cgy (Optional): 3078.47
Treatment Time / Fractionation (Optional- Include Units): 0.39
Number Of Treatment Days: 1
Port Dimensions-X Axis In Cm: 2.5
Time Dose Fractionation (Optional- Include Units If Applicable): 89
Treatment Margins In Cm: 0.5
Treatment Time In Min (Optional): 0.40
Fractions / Week: 3
Dimensions-Y Axis In Cm: 1.5
Total Number Of Fractions: 20
Fractions / Week Rx 2: 4
Total Dose (Optional-Please Include Units): 5155.8cGy
Daily Fractionated Dose (Optional- Include Units) Rx 2: 255 cGy
Additional Prescription Justification Text: If there is any interruption in treatment exceeding 5 days please see Decay and Dose Adjustment Calculation and complete treatment under Prescription 2.
Functional Status: 2 (ambulatory, performs self-care)
Dose / Tx In Cgy (Optional): 256.0
Daily Fractionated Dose (Optional- Include Units): 257.79cGy
Custom Shielding Preamble Text Will Not Be Included With Simple Simulations (.......... X X Y Cm): A lead shield of 0.762 mm thickness is utilized to form a molded, custom shield with a

## 2018-05-25 ENCOUNTER — APPOINTMENT (OUTPATIENT)
Age: 83
Setting detail: DERMATOLOGY
End: 2018-05-29

## 2018-05-25 PROBLEM — C44.719 BASAL CELL CARCINOMA OF SKIN OF LEFT LOWER LIMB, INCLUDING HIP: Status: ACTIVE | Noted: 2018-05-25

## 2018-05-25 PROCEDURE — G6001 ECHO GUIDANCE RADIOTHERAPY: HCPCS

## 2018-05-25 PROCEDURE — 77280 THER RAD SIMULAJ FIELD SMPL: CPT

## 2018-05-25 PROCEDURE — OTHER FOLLOW UP FOR NEXT VISIT: OTHER

## 2018-05-25 PROCEDURE — OTHER TREATMENT REGIMEN: OTHER

## 2018-05-25 PROCEDURE — OTHER SUPERFICIAL RADIATION TREATMENT: OTHER

## 2018-05-25 PROCEDURE — 77401 RADIATION TX DELIVERY SUPFC: CPT

## 2018-05-25 NOTE — PROCEDURE: SUPERFICIAL RADIATION TREATMENT
Render Patient Eligibility And Selection In Note?: No
Treatment Device Design After Initial Simulation Justification (Will Render If Bill For Treatment Devices = Yes): The patient is status post radiation simulation and is evaluated as to the use of additional devices for shielding and placement for radiation therapy.
Field Size (Applicator): 3.0 cm
Shielding Size (Optional- Include Units) Rx 2: 2.5 x 2.5
Total Dose (Optional-Please Include Units): 5155.8cGy
Dose / Tx In Cgy (Optional): 256.0
Fractions / Week Rx 3: 5
Bill For Radiation Treatment: Yes
Cumulative Dose In Cgy (Optional): 3334.47
Port Dimensions-Y Axis In Cm: 2.5
Number Of Treatment Days: 1
Treatment Time / Fractionation (Optional- Include Units): 0.39
Energy (Optional-Please Include Units): 70kV
Initial Radiation Treatment Planning (Will Render If Bill Simulation = Yes): The patient had a complete consultation regarding all applicable modalities for the treatment of their skin cancer and based on a variety of factors including the type of tumor, size, and location, the relevant medical history as well as local tissue factors, the functional status of the individual, the ability to perform necessary postoperative wound instructions and the need for simultaneous treatments as well as overall wound healing status, it was determined that the patient would begin radiation therapy treatment for skin cancer.  A full simulation and treatment device design was performed including the determination and formulation of appropriate simple and complex devices including lead shield of 0.762 mm thickness to form molded customized shielding to specifically correlate with the lesion size including treatment margin.  The custom lead shield is adequate to accommodate the appropriate applicator and provide adequate shielding around the treatment site.  The specific field applicator, shields, and devices both simple and complex as well as the specific patient setup is outlined below.  The patient was given a full consent for superficial radiation to both verbally and in writing and the full determination of patient's eligibility for treatment and selection is outlined on the patient eligibility and treatment selection form.  The specific superficial radiotherapy prescription was determined and was documented on the superficial radiotherapy prescription form.  A treatment calculation was also performed and documented on the treatment calculation form.  Based on the prescription, the patient was scheduled for a series of fractional treatments.
Day Of The Week Treatment Administered: Friday
Computed Treatment Time In Min (Will Render The Same As Calculated Treatment Time If Left Blank): per device
Daily Fractionated Dose (Optional- Include Units): 257.79cGy
Total Number Of Fractions Rx 3: 15
Dose Per Fractionation In Cgy (Optional): 257.79
Dimensions-Y Axis In Cm: 1.5
Time Dose Fractionation (Optional- Include Units If Applicable): 89
Energy Output In Cgy/Min (Optional): .39
Fractionation Number: 13
Total Number Of Fractions: 20
Treatment Margins In Cm: 0.5
Fractions / Week Rx 2: 4
Custom Shielding Afterword Text Will Not Be Included With Simple Simulations (X X Y Cm............): port to correlate with the lesion size, including treatment margin. The custom lead shield is adequate to accommodate the appropriate applicator and provide adequate shielding around the treatment site. Additional shielding (as noted below) is used to protect sensitive, normal tissues.
Energy (Optional-Please Include Units) Rx 2: 50 kV
Assessment: Appropriate reaction
Daily Fractionated Dose (Optional- Include Units) Rx 2: 255 cGy
Simple Simulation Preamble Text Will Be Included With Simple Simulations (.......... Indications): Simple simulation was performed today for the following reasons:
Functional Status: 2 (ambulatory, performs self-care)
Detail Level: Detailed
Fractions / Week: 3
Custom Shielding Preamble Text Will Not Be Included With Simple Simulations (.......... X X Y Cm): A lead shield of 0.762 mm thickness is utilized to form a molded, custom shield with a
Patient Positioning: Sitting
Additional Prescription Justification Text: If there is any interruption in treatment exceeding 5 days please see Decay and Dose Adjustment Calculation and complete treatment under Prescription 2.
Treatment Time In Min (Optional): 0.40

## 2018-05-25 NOTE — PROCEDURE: TREATMENT REGIMEN
Detail Level: Zone
Samples Given: Aquaphor
Plan: Per the request of Marianne Desir Betty was seen today for Superficial Radiation Therapy management.  A high frequency ultrasound image was acquired prior to treatment today for three dimensional evaluation of tumor volume and response to treatment, in addition, geometric accuracy of field placement (CPT®  ). Physician evaluation of the ultrasound tumor depth will be ongoing through course of treatment, and is deemed medically necessary ensuring efficacy of treatment. Today’s image and setup was evaluated determining continuation of treatment with the current plan, or necessary changes as appropriate. All appropriate custom blocking and treatment parameters verified by the Radiation therapist  according to initial simulation. \\n \\nPer Dr. Berry, continued daily US guidance and measurement of tumor depth, progress and edema monitoring.\\n\\nEvaluation for response and reaction to SRT based on current fraction and cumulative dose with a visual inspection and ultrasound demonstrates a normal expected response.  RTOG Acute Radiation Morbidity Score = 2.  Superficial Radiation Therapy will continue as planned.\\n\\nUS image guidance and field placement prior to treatment delivery performed.  \\nUS depth is 1.44mm (edema), Repop ++,  TONIO equivocal

## 2018-05-30 ENCOUNTER — APPOINTMENT (OUTPATIENT)
Age: 83
Setting detail: DERMATOLOGY
End: 2018-05-30

## 2018-05-30 PROBLEM — C44.719 BASAL CELL CARCINOMA OF SKIN OF LEFT LOWER LIMB, INCLUDING HIP: Status: ACTIVE | Noted: 2018-05-30

## 2018-05-30 PROCEDURE — G6001 ECHO GUIDANCE RADIOTHERAPY: HCPCS

## 2018-05-30 PROCEDURE — OTHER FOLLOW UP FOR NEXT VISIT: OTHER

## 2018-05-30 PROCEDURE — OTHER SUPERFICIAL RADIATION TREATMENT: OTHER

## 2018-05-30 PROCEDURE — 77401 RADIATION TX DELIVERY SUPFC: CPT

## 2018-05-30 PROCEDURE — 77280 THER RAD SIMULAJ FIELD SMPL: CPT

## 2018-05-30 PROCEDURE — OTHER TREATMENT REGIMEN: OTHER

## 2018-05-30 NOTE — PROCEDURE: TREATMENT REGIMEN
Detail Level: Zone
Plan: Per the request of Dr. Berry, patient was seen today for Superficial Radiation Therapy requiring simulation (CPT® 41863) in preparation for treatment of specific diseased site(s). Simulation is necessary to determine correct patient and treatment portal positioning, deliver safe and effective radiation therapy. A high frequency ultrasound image was acquired prior to treatment today for three dimensional evaluation of tumor volume and response to treatment, in addition, geometric accuracy of field placement (CPT®  ). Physician evaluation of the ultrasound tumor depth will be ongoing through course of treatment, and is deemed medically necessary ensuring efficacy of treatment. Today’s image and setup was evaluated determining continuation of treatment with the current plan, or necessary changes as appropriate. All appropriate custom blocking and treatment parameters verified by the radiation therapist according to initial simulation. \\n\\nPer Dr. Berry, continued daily US guidance and simulation is required for field placement, measurement of tumor depth, progress and edema monitoring.\\n\\nEvaluation prior to treatment for response and reaction to SRT based on current fraction and cumulative dose with a visual inspection and ultrasound demonstrates a normal expected response.  RTOG Acute Radiation Morbidity Score = 0.  Superficial Radiation Therapy will continue as planned.\\n\\nUS image guidance and field placement prior to treatment delivery performed. \\nUS depth is 0.99mm, Repop ++,  TONIO equivocal
Samples Given: Aquaphor

## 2018-05-30 NOTE — PROCEDURE: SUPERFICIAL RADIATION TREATMENT
Render Prescriptions In Note?: No
Total Number Of Fractions: 20
Patient Positioning: Sitting
Fractions / Week Rx 2: 4
Assessment: Appropriate reaction
Field Size (Applicator): 3.0 cm
Initial Radiation Treatment Planning (Will Render If Bill Simulation = Yes): The patient had a complete consultation regarding all applicable modalities for the treatment of their skin cancer and based on a variety of factors including the type of tumor, size, and location, the relevant medical history as well as local tissue factors, the functional status of the individual, the ability to perform necessary postoperative wound instructions and the need for simultaneous treatments as well as overall wound healing status, it was determined that the patient would begin radiation therapy treatment for skin cancer.  A full simulation and treatment device design was performed including the determination and formulation of appropriate simple and complex devices including lead shield of 0.762 mm thickness to form molded customized shielding to specifically correlate with the lesion size including treatment margin.  The custom lead shield is adequate to accommodate the appropriate applicator and provide adequate shielding around the treatment site.  The specific field applicator, shields, and devices both simple and complex as well as the specific patient setup is outlined below.  The patient was given a full consent for superficial radiation to both verbally and in writing and the full determination of patient's eligibility for treatment and selection is outlined on the patient eligibility and treatment selection form.  The specific superficial radiotherapy prescription was determined and was documented on the superficial radiotherapy prescription form.  A treatment calculation was also performed and documented on the treatment calculation form.  Based on the prescription, the patient was scheduled for a series of fractional treatments.
Number Of Treatment Days: 1
Energy (Optional-Please Include Units): 70kV
Functional Status: 2 (ambulatory, performs self-care)
Energy Output In Cgy/Min (Optional): .39
Dose / Tx In Cgy (Optional): 256.0
Dose Per Fractionation In Cgy (Optional): 257.79
Fractions / Week Rx 3: 5
Total Number Of Fractions Rx 4: 15
Daily Fractionated Dose (Optional- Include Units) Rx 2: 255 cGy
Computed Treatment Time In Min (Will Render The Same As Calculated Treatment Time If Left Blank): per device
Energy (Optional-Please Include Units) Rx 2: 50 kV
Cumulative Dose In Cgy (Optional): 3590.47
Custom Shielding Preamble Text Will Not Be Included With Simple Simulations (.......... X X Y Cm): A lead shield of 0.762 mm thickness is utilized to form a molded, custom shield with a
Treatment Margins In Cm: 0.5
Treatment Device Design After Initial Simulation Justification (Will Render If Bill For Treatment Devices = Yes): The patient is status post radiation simulation and is evaluated as to the use of additional devices for shielding and placement for radiation therapy.
Simple Simulation Preamble Text Will Be Included With Simple Simulations (.......... Indications): Simple simulation was performed today for the following reasons:
Bill For Radiation Treatment: Yes
Dimensions-Y Axis In Cm: 1.5
Total Dose (Optional-Please Include Units): 5155.8cGy
Additional Prescription Justification Text: If there is any interruption in treatment exceeding 5 days please see Decay and Dose Adjustment Calculation and complete treatment under Prescription 2.
Daily Fractionated Dose (Optional- Include Units): 257.79cGy
Detail Level: Detailed
Treatment Time In Min (Optional): 0.40
Time Dose Fractionation (Optional- Include Units If Applicable): 89
Fractionation Number: 14
Shielding Size (Optional- Include Units) Rx 2: 2.5 x 2.5
Fractions / Week: 3
Custom Shielding Afterword Text Will Not Be Included With Simple Simulations (X X Y Cm............): port to correlate with the lesion size, including treatment margin. The custom lead shield is adequate to accommodate the appropriate applicator and provide adequate shielding around the treatment site. Additional shielding (as noted below) is used to protect sensitive, normal tissues.
Port Dimensions-Y Axis In Cm: 2.5
Day Of The Week Treatment Administered: Wednesday
Treatment Time / Fractionation (Optional- Include Units): 0.39

## 2018-06-01 ENCOUNTER — APPOINTMENT (OUTPATIENT)
Age: 83
Setting detail: DERMATOLOGY
End: 2018-06-04

## 2018-06-01 PROBLEM — C44.719 BASAL CELL CARCINOMA OF SKIN OF LEFT LOWER LIMB, INCLUDING HIP: Status: ACTIVE | Noted: 2018-06-01

## 2018-06-01 PROCEDURE — G6001 ECHO GUIDANCE RADIOTHERAPY: HCPCS

## 2018-06-01 PROCEDURE — 77280 THER RAD SIMULAJ FIELD SMPL: CPT

## 2018-06-01 PROCEDURE — 77427 RADIATION TX MANAGEMENT X5: CPT | Mod: 25

## 2018-06-01 PROCEDURE — OTHER FOLLOW UP FOR NEXT VISIT: OTHER

## 2018-06-01 PROCEDURE — 77401 RADIATION TX DELIVERY SUPFC: CPT

## 2018-06-01 PROCEDURE — OTHER SUPERFICIAL RADIATION TREATMENT: OTHER

## 2018-06-01 PROCEDURE — OTHER TREATMENT REGIMEN: OTHER

## 2018-06-01 NOTE — PROCEDURE: SUPERFICIAL RADIATION TREATMENT
Bill For Dosimetry/Render Treatment Time Calculation In Note: No
Number Of Treatment Days: 1
Assessment: Appropriate reaction
Bill And Render Text From Evaluation And Management Tab (Will Bill [id number]): Yes
Dimensions-Y Axis In Cm: 1.5
Energy (Optional-Please Include Units): 70kV
Cumulative Dose In Cgy (Optional): 3846.47
Time Dose Fractionation (Optional- Include Units If Applicable): 89
Patient Positioning: Sitting
Treatment Margins In Cm: 0.5
Daily Fractionated Dose (Optional- Include Units) Rx 2: 255 cGy
Treatment Time / Fractionation (Optional- Include Units): 0.39
Field Size (Applicator): 3.0 cm
Additional Prescription Justification Text: If there is any interruption in treatment exceeding 5 days please see Decay and Dose Adjustment Calculation and complete treatment under Prescription 2.
Comments: RTOG 1
Simple Simulation Afterword Text Will Be Included With Simple Simulations (Indications............): The patient had a complete consultation regarding all applicable modalities for the treatment of their skin cancer and based on a variety of factors including the type of tumor, size, and location, the relevant medical history as well as local tissue factors, the functional status of the individual, the ability to perform necessary postoperative wound instructions and the need for simultaneous treatments as well as overall wound healing status, it was determined that the patient would begin radiation therapy treatment for skin cancer.  A full simulation and treatment device design was performed including the determination and formulation of appropriate simple and complex devices including lead shield of 0.762 mm thickness to form molded customized shielding to specifically correlate with the lesion size including treatment margin.  The custom lead shield is adequate to accommodate the appropriate applicator and provide adequate shielding around the treatment site.  The specific field applicator, shields, and devices both simple and complex as well as the specific patient setup is outlined below.  The patient was given a full consent for superficial radiation to both verbally and in writing and the full determination of patient's eligibility for treatment and selection is outlined on the patient eligibility and treatment selection form.  The specific superficial radiotherapy prescription was determined and was documented on the superficial radiotherapy prescription form.  A treatment calculation was also performed and documented on the treatment calculation form.  Based on the prescription, the patient was scheduled for a series of fractional treatments.
Shielding Size (Optional- Include Units): 2.5 X 2.5
Fractions / Week Rx 4: 5
Functional Status: 2 (ambulatory, performs self-care)
Day Of The Week Treatment Administered: Friday
Treatment Time In Min (Optional): 0.40
Fractions / Week Rx 2: 4
Treatment Device Design After Initial Simulation Justification (Will Render If Bill For Treatment Devices = Yes): The patient is status post radiation simulation and is evaluated as to the use of additional devices for shielding and placement for radiation therapy.
Fractions / Week: 3
Port Dimensions-Y Axis In Cm: 2.5
Fractionation Number (Evaluation): 15
Computed Treatment Time In Min (Will Render The Same As Calculated Treatment Time If Left Blank): per device
Simple Simulation Preamble Text Will Be Included With Simple Simulations (.......... Indications): Simple simulation was performed today for the following reasons:
Total Number Of Fractions: 20
Energy (Optional-Please Include Units) Rx 2: 50 kV
Energy Output In Cgy/Min (Optional): .39
Dose / Tx In Cgy (Optional): 256.0
Dose Per Fractionation In Cgy (Optional): 257.79
Custom Shielding Afterword Text Will Not Be Included With Simple Simulations (X X Y Cm............): port to correlate with the lesion size, including treatment margin. The custom lead shield is adequate to accommodate the appropriate applicator and provide adequate shielding around the treatment site. Additional shielding (as noted below) is used to protect sensitive, normal tissues.
Daily Fractionated Dose (Optional- Include Units): 257.79cGy
Custom Shielding Preamble Text Will Not Be Included With Simple Simulations (.......... X X Y Cm): A lead shield of 0.762 mm thickness is utilized to form a molded, custom shield with a
Total Dose (Optional-Please Include Units): 5155.8cGy
Detail Level: Detailed

## 2018-06-01 NOTE — PROCEDURE: TREATMENT REGIMEN
Samples Given: Aquaphor
Detail Level: Zone
Plan: Per the request of Dr. Berry, patient was seen today for Superficial Radiation Therapy requiring simulation (CPT® 08198) in preparation for treatment of specific diseased site(s). Simulation is necessary to determine correct patient and treatment portal positioning, deliver safe and effective radiation therapy. A high frequency ultrasound image was acquired prior to treatment today for three dimensional evaluation of tumor volume and response to treatment, in addition, geometric accuracy of field placement (CPT®  ). Physician evaluation of the ultrasound tumor depth will be ongoing through course of treatment, and is deemed medically necessary ensuring efficacy of treatment. Today’s image and setup was evaluated determining continuation of treatment with the current plan, or necessary changes as appropriate. All appropriate custom blocking and treatment parameters verified by the radiation therapist according to initial simulation. \\n\\nPer Dr. Berry, continued daily US guidance and simulation is required for field placement, measurement of tumor depth, progress and edema monitoring.\\n\\nEvaluation prior to treatment for response and reaction to SRT based on current fraction and cumulative dose with a visual inspection and ultrasound demonstrates a normal expected response.  RTOG Acute Radiation Morbidity Score = 0.  Superficial Radiation Therapy will continue as planned.\\n\\nUS image guidance and field placement prior to treatment delivery performed. \\nUS depth is 1.22mm, Repop ++,  TONIO equivocal

## 2018-06-04 ENCOUNTER — APPOINTMENT (OUTPATIENT)
Age: 83
Setting detail: DERMATOLOGY
End: 2018-06-04

## 2018-06-04 PROBLEM — C44.719 BASAL CELL CARCINOMA OF SKIN OF LEFT LOWER LIMB, INCLUDING HIP: Status: ACTIVE | Noted: 2018-06-04

## 2018-06-04 PROCEDURE — G6001 ECHO GUIDANCE RADIOTHERAPY: HCPCS

## 2018-06-04 PROCEDURE — OTHER FOLLOW UP FOR NEXT VISIT: OTHER

## 2018-06-04 PROCEDURE — 77401 RADIATION TX DELIVERY SUPFC: CPT

## 2018-06-04 PROCEDURE — OTHER SUPERFICIAL RADIATION TREATMENT: OTHER

## 2018-06-04 PROCEDURE — OTHER TREATMENT REGIMEN: OTHER

## 2018-06-04 PROCEDURE — 77280 THER RAD SIMULAJ FIELD SMPL: CPT

## 2018-06-04 NOTE — PROCEDURE: SUPERFICIAL RADIATION TREATMENT
Field Size (Applicator): 3.0 cm
Treatment Time / Fractionation (Optional- Include Units): 0.39
Total Number Of Fractions Rx 2: 15
Bill For Simulation And Treatment Device Design: No
Assessment: Appropriate reaction
Fractionation Number: 16
Fractions / Week Rx 2: 4
Comments: RTOG 1
Treatment Margins In Cm: 0.5
Cumulative Dose In Cgy (Optional): 4102.47
Energy (Optional-Please Include Units) Rx 2: 50 kV
Day Of The Week Treatment Administered: Monday
Bill For Radiation Treatment: Yes
Simple Simulation Preamble Text Will Be Included With Simple Simulations (.......... Indications): Simple simulation was performed today for the following reasons:
Shielding Size (Optional- Include Units): 2.5 X 2.5
Prescription Used: 1
Computed Treatment Time In Min (Will Render The Same As Calculated Treatment Time If Left Blank): per device
Dimensions-Y Axis In Cm: 1.5
Total Number Of Fractions: 20
Energy (Include Units): 70kV
Custom Shielding Preamble Text Will Not Be Included With Simple Simulations (.......... X X Y Cm): A lead shield of 0.762 mm thickness is utilized to form a molded, custom shield with a
Initial Radiation Treatment Planning (Will Render If Bill Simulation = Yes): The patient had a complete consultation regarding all applicable modalities for the treatment of their skin cancer and based on a variety of factors including the type of tumor, size, and location, the relevant medical history as well as local tissue factors, the functional status of the individual, the ability to perform necessary postoperative wound instructions and the need for simultaneous treatments as well as overall wound healing status, it was determined that the patient would begin radiation therapy treatment for skin cancer.  A full simulation and treatment device design was performed including the determination and formulation of appropriate simple and complex devices including lead shield of 0.762 mm thickness to form molded customized shielding to specifically correlate with the lesion size including treatment margin.  The custom lead shield is adequate to accommodate the appropriate applicator and provide adequate shielding around the treatment site.  The specific field applicator, shields, and devices both simple and complex as well as the specific patient setup is outlined below.  The patient was given a full consent for superficial radiation to both verbally and in writing and the full determination of patient's eligibility for treatment and selection is outlined on the patient eligibility and treatment selection form.  The specific superficial radiotherapy prescription was determined and was documented on the superficial radiotherapy prescription form.  A treatment calculation was also performed and documented on the treatment calculation form.  Based on the prescription, the patient was scheduled for a series of fractional treatments.
Time Dose Fractionation (Optional- Include Units If Applicable): 89
Port Dimensions-X Axis In Cm: 2.5
Total Dose (Optional-Please Include Units): 5155.8cGy
Treatment Time In Min (Optional): 0.40
Fractions / Week: 3
Dose Per Fractionation In Cgy (Optional): 257.79
Functional Status: 2 (ambulatory, performs self-care)
Additional Prescription Justification Text: If there is any interruption in treatment exceeding 5 days please see Decay and Dose Adjustment Calculation and complete treatment under Prescription 2.
Fractions / Week Rx 4: 5
Treatment Device Design After Initial Simulation Justification (Will Render If Bill For Treatment Devices = Yes): The patient is status post radiation simulation and is evaluated as to the use of additional devices for shielding and placement for radiation therapy.
Dose / Tx In Cgy (Optional): 256.0
Patient Positioning: Sitting
Daily Fractionated Dose (Optional- Include Units): 257.79cGy
Detail Level: Detailed
Custom Shielding Afterword Text Will Not Be Included With Simple Simulations (X X Y Cm............): port to correlate with the lesion size, including treatment margin. The custom lead shield is adequate to accommodate the appropriate applicator and provide adequate shielding around the treatment site. Additional shielding (as noted below) is used to protect sensitive, normal tissues.
Energy Output In Cgy/Min (Optional): .39
Daily Fractionated Dose (Optional- Include Units) Rx 2: 255 cGy

## 2018-06-04 NOTE — PROCEDURE: TREATMENT REGIMEN
Samples Given: Aquaphor
Plan: Per the request of Dr. Berry, patient was seen today for Superficial Radiation Therapy requiring simulation (CPT® 90439) in preparation for treatment of specific diseased site(s). Simulation is necessary to determine correct patient and treatment portal positioning, deliver safe and effective radiation therapy. A high frequency ultrasound image was acquired prior to treatment today for three dimensional evaluation of tumor volume and response to treatment, in addition, geometric accuracy of field placement (CPT®  ). Physician evaluation of the ultrasound tumor depth will be ongoing through course of treatment, and is deemed medically necessary ensuring efficacy of treatment. Today’s image and setup was evaluated determining continuation of treatment with the current plan, or necessary changes as appropriate. All appropriate custom blocking and treatment parameters verified by the radiation therapist according to initial simulation. \\n\\nPer Dr. Berry, continued daily US guidance and simulation is required for field placement, measurement of tumor depth, progress and edema monitoring.\\n\\nEvaluation prior to treatment for response and reaction to SRT based on current fraction and cumulative dose with a visual inspection and ultrasound demonstrates a normal expected response.  RTOG Acute Radiation Morbidity Score = 0.  Superficial Radiation Therapy will continue as planned.\\n\\nUS image guidance and field placement prior to treatment delivery performed. \\nUS depth is 0.9mm, Repop ++,  TONIO equivocal
Detail Level: Zone

## 2018-06-06 ENCOUNTER — APPOINTMENT (OUTPATIENT)
Age: 83
Setting detail: DERMATOLOGY
End: 2018-06-06

## 2018-06-06 PROBLEM — C44.719 BASAL CELL CARCINOMA OF SKIN OF LEFT LOWER LIMB, INCLUDING HIP: Status: ACTIVE | Noted: 2018-06-06

## 2018-06-06 PROCEDURE — OTHER FOLLOW UP FOR NEXT VISIT: OTHER

## 2018-06-06 PROCEDURE — 77280 THER RAD SIMULAJ FIELD SMPL: CPT

## 2018-06-06 PROCEDURE — OTHER SUPERFICIAL RADIATION TREATMENT: OTHER

## 2018-06-06 PROCEDURE — G6001 ECHO GUIDANCE RADIOTHERAPY: HCPCS

## 2018-06-06 PROCEDURE — 77401 RADIATION TX DELIVERY SUPFC: CPT

## 2018-06-06 PROCEDURE — OTHER TREATMENT REGIMEN: OTHER

## 2018-06-06 NOTE — PROCEDURE: TREATMENT REGIMEN
Plan: Per the request of Dr. Berry, patient was seen today for Superficial Radiation Therapy requiring simulation (CPT® 61898) in preparation for treatment of specific diseased site(s). Simulation is necessary to determine correct patient and treatment portal positioning, deliver safe and effective radiation therapy. A high frequency ultrasound image was acquired prior to treatment today for three dimensional evaluation of tumor volume and response to treatment, in addition, geometric accuracy of field placement (CPT®  ). Physician evaluation of the ultrasound tumor depth will be ongoing through course of treatment, and is deemed medically necessary ensuring efficacy of treatment. Today’s image and setup was evaluated determining continuation of treatment with the current plan, or necessary changes as appropriate. All appropriate custom blocking and treatment parameters verified by the radiation therapist according to initial simulation. \\n\\nPer Dr. Berry, continued daily US guidance and simulation is required for field placement, measurement of tumor depth, progress and edema monitoring.\\n\\nEvaluation prior to treatment for response and reaction to SRT based on current fraction and cumulative dose with a visual inspection and ultrasound demonstrates a normal expected response.  RTOG Acute Radiation Morbidity Score = 0.  Superficial Radiation Therapy will continue as planned.\\n\\nUS image guidance and field placement prior to treatment delivery performed. \\nUS depth is 0.97mm, Repop ++,  TONIO equivocal
Detail Level: Zone
Samples Given: Aquaphor

## 2018-06-06 NOTE — PROCEDURE: SUPERFICIAL RADIATION TREATMENT
Treatment Device Design After Initial Simulation Justification (Will Render If Bill For Treatment Devices = Yes): The patient is status post radiation simulation and is evaluated as to the use of additional devices for shielding and placement for radiation therapy.
Field Size (Applicator) Rx 2: 3.0 cm
Bill For Radiation Treatment: Yes
Custom Shielding Preamble Text Will Not Be Included With Simple Simulations (.......... X X Y Cm): A lead shield of 0.762 mm thickness is utilized to form a molded, custom shield with a
Render Text From Evaluation And Management Tab (Will Not Bill 33862): No
Dose / Tx In Cgy (Optional): 256.0
Time Dose Fractionation (Optional- Include Units If Applicable): 89
Prescription Used: 1
Assessment: Appropriate reaction
Daily Fractionated Dose (Optional- Include Units) Rx 2: 255 cGy
Detail Level: Detailed
Computed Treatment Time In Min (Will Render The Same As Calculated Treatment Time If Left Blank): per device
Fractions / Week Rx 4: 5
Port Dimensions-X Axis In Cm: 2.5
Treatment Time In Min (Optional): 0.40
Energy (Optional-Please Include Units) Rx 2: 50 kV
Energy (Include Units): 70kV
Treatment Margins In Cm: 0.5
Shielding Size (Optional- Include Units): 2.5 X 2.5
Functional Status: 2 (ambulatory, performs self-care)
Total Number Of Fractions Rx 2: 15
Comments: RTOG 1
Dimensions-Y Axis In Cm: 1.5
Additional Prescription Justification Text: If there is any interruption in treatment exceeding 5 days please see Decay and Dose Adjustment Calculation and complete treatment under Prescription 2.
Fractions / Week Rx 2: 4
Custom Shielding Afterword Text Will Not Be Included With Simple Simulations (X X Y Cm............): port to correlate with the lesion size, including treatment margin. The custom lead shield is adequate to accommodate the appropriate applicator and provide adequate shielding around the treatment site. Additional shielding (as noted below) is used to protect sensitive, normal tissues.
Total Number Of Fractions: 20
Energy Output In Cgy/Min (Optional): .39
Day Of The Week Treatment Administered: Wednesday
Cumulative Dose In Cgy (Optional): 4358.47
Treatment Time / Fractionation (Optional- Include Units): 0.39
Daily Fractionated Dose (Optional- Include Units): 257.79cGy
Total Dose (Optional-Please Include Units): 5155.8cGy
Simple Simulation Afterword Text Will Be Included With Simple Simulations (Indications............): The patient had a complete consultation regarding all applicable modalities for the treatment of their skin cancer and based on a variety of factors including the type of tumor, size, and location, the relevant medical history as well as local tissue factors, the functional status of the individual, the ability to perform necessary postoperative wound instructions and the need for simultaneous treatments as well as overall wound healing status, it was determined that the patient would begin radiation therapy treatment for skin cancer.  A full simulation and treatment device design was performed including the determination and formulation of appropriate simple and complex devices including lead shield of 0.762 mm thickness to form molded customized shielding to specifically correlate with the lesion size including treatment margin.  The custom lead shield is adequate to accommodate the appropriate applicator and provide adequate shielding around the treatment site.  The specific field applicator, shields, and devices both simple and complex as well as the specific patient setup is outlined below.  The patient was given a full consent for superficial radiation to both verbally and in writing and the full determination of patient's eligibility for treatment and selection is outlined on the patient eligibility and treatment selection form.  The specific superficial radiotherapy prescription was determined and was documented on the superficial radiotherapy prescription form.  A treatment calculation was also performed and documented on the treatment calculation form.  Based on the prescription, the patient was scheduled for a series of fractional treatments.
Patient Positioning: Sitting
Fractions / Week: 3
Dose Per Fractionation In Cgy (Optional): 257.79
Simple Simulation Preamble Text Will Be Included With Simple Simulations (.......... Indications): Simple simulation was performed today for the following reasons:
Fractionation Number: 17

## 2018-06-08 ENCOUNTER — APPOINTMENT (OUTPATIENT)
Age: 83
Setting detail: DERMATOLOGY
End: 2018-06-11

## 2018-06-08 PROBLEM — C44.719 BASAL CELL CARCINOMA OF SKIN OF LEFT LOWER LIMB, INCLUDING HIP: Status: ACTIVE | Noted: 2018-06-08

## 2018-06-08 PROCEDURE — OTHER SUPERFICIAL RADIATION TREATMENT: OTHER

## 2018-06-08 PROCEDURE — OTHER TREATMENT REGIMEN: OTHER

## 2018-06-08 PROCEDURE — OTHER FOLLOW UP FOR NEXT VISIT: OTHER

## 2018-06-08 PROCEDURE — G6001 ECHO GUIDANCE RADIOTHERAPY: HCPCS

## 2018-06-08 PROCEDURE — 77401 RADIATION TX DELIVERY SUPFC: CPT

## 2018-06-08 PROCEDURE — 77280 THER RAD SIMULAJ FIELD SMPL: CPT

## 2018-06-08 NOTE — PROCEDURE: SUPERFICIAL RADIATION TREATMENT
Field Size (Applicator) Rx 2: 3.0 cm
Bill For Dosimetry/Render Decay And Dose Adjustment Calculation In Note: No
Number Of Days Off Treatment: 1
Dimensions-X Axis In Cm: 1.5
Additional Prescription Justification Text: If there is any interruption in treatment exceeding 5 days please see Decay and Dose Adjustment Calculation and complete treatment under Prescription 2.
Shielding Size (Optional- Include Units) Rx 2: 2.5 x 2.5
Detail Level: Detailed
Energy (Optional-Please Include Units): 70kV
Custom Shielding Afterword Text Will Not Be Included With Simple Simulations (X X Y Cm............): port to correlate with the lesion size, including treatment margin. The custom lead shield is adequate to accommodate the appropriate applicator and provide adequate shielding around the treatment site. Additional shielding (as noted below) is used to protect sensitive, normal tissues.
Total Number Of Fractions Rx 3: 15
Fractions / Week Rx 3: 5
Dose Per Fractionation In Cgy (Optional): 257.79
Bill For Radiation Treatment: Yes
Daily Fractionated Dose (Optional- Include Units) Rx 2: 255 cGy
Fractionation Number: 18
Total Number Of Fractions: 20
Day Of The Week Treatment Administered: Friday
Treatment Device Design After Initial Simulation Justification (Will Render If Bill For Treatment Devices = Yes): The patient is status post radiation simulation and is evaluated as to the use of additional devices for shielding and placement for radiation therapy.
Simple Simulation Preamble Text Will Be Included With Simple Simulations (.......... Indications): Simple simulation was performed today for the following reasons:
Energy (Optional-Please Include Units) Rx 2: 50 kV
Port Dimensions-Y Axis In Cm: 2.5
Treatment Time / Fractionation (Optional- Include Units): 0.39
Custom Shielding Preamble Text Will Not Be Included With Simple Simulations (.......... X X Y Cm): A lead shield of 0.762 mm thickness is utilized to form a molded, custom shield with a
Cumulative Dose In Cgy (Optional): 4614.47
Energy Output In Cgy/Min (Optional): .39
Assessment: Appropriate reaction
Treatment Time In Min (Optional): 0.40
Dose / Tx In Cgy (Optional): 256.0
Fractions / Week: 3
Computed Treatment Time In Min (Will Render The Same As Calculated Treatment Time If Left Blank): per device
Fractions / Week Rx 2: 4
Initial Radiation Treatment Planning (Will Render If Bill Simulation = Yes): The patient had a complete consultation regarding all applicable modalities for the treatment of their skin cancer and based on a variety of factors including the type of tumor, size, and location, the relevant medical history as well as local tissue factors, the functional status of the individual, the ability to perform necessary postoperative wound instructions and the need for simultaneous treatments as well as overall wound healing status, it was determined that the patient would begin radiation therapy treatment for skin cancer.  A full simulation and treatment device design was performed including the determination and formulation of appropriate simple and complex devices including lead shield of 0.762 mm thickness to form molded customized shielding to specifically correlate with the lesion size including treatment margin.  The custom lead shield is adequate to accommodate the appropriate applicator and provide adequate shielding around the treatment site.  The specific field applicator, shields, and devices both simple and complex as well as the specific patient setup is outlined below.  The patient was given a full consent for superficial radiation to both verbally and in writing and the full determination of patient's eligibility for treatment and selection is outlined on the patient eligibility and treatment selection form.  The specific superficial radiotherapy prescription was determined and was documented on the superficial radiotherapy prescription form.  A treatment calculation was also performed and documented on the treatment calculation form.  Based on the prescription, the patient was scheduled for a series of fractional treatments.
Time Dose Fractionation (Optional- Include Units If Applicable): 89
Functional Status: 2 (ambulatory, performs self-care)
Total Dose (Optional-Please Include Units): 5155.8cGy
Treatment Margins In Cm: 0.5
Daily Fractionated Dose (Optional- Include Units): 257.79cGy
Comments: RTOG 1
Patient Positioning: Sitting

## 2018-06-08 NOTE — PROCEDURE: TREATMENT REGIMEN
Samples Given: Aquaphor
Plan: Per the request of Dr. Berry, patient was seen today for Superficial Radiation Therapy requiring simulation (CPT® 77984) in preparation for treatment of specific diseased site(s). Simulation is necessary to determine correct patient and treatment portal positioning, deliver safe and effective radiation therapy. A high frequency ultrasound image was acquired prior to treatment today for three dimensional evaluation of tumor volume and response to treatment, in addition, geometric accuracy of field placement (CPT®  ). Physician evaluation of the ultrasound tumor depth will be ongoing through course of treatment, and is deemed medically necessary ensuring efficacy of treatment. Today’s image and setup was evaluated determining continuation of treatment with the current plan, or necessary changes as appropriate. All appropriate custom blocking and treatment parameters verified by the radiation therapist according to initial simulation. \\n\\nPer Dr. Berry, continued daily US guidance and simulation is required for field placement, measurement of tumor depth, progress and edema monitoring.\\n\\nEvaluation prior to treatment for response and reaction to SRT based on current fraction and cumulative dose with a visual inspection and ultrasound demonstrates a normal expected response.  RTOG Acute Radiation Morbidity Score = 0.  Superficial Radiation Therapy will continue as planned.\\n\\nUS image guidance and field placement prior to treatment delivery performed. \\nUS depth is 1.17mm, Repop ++,  TONIO equivocal
Detail Level: Zone

## 2018-06-11 ENCOUNTER — APPOINTMENT (OUTPATIENT)
Age: 83
Setting detail: DERMATOLOGY
End: 2018-06-11

## 2018-06-11 PROBLEM — C44.719 BASAL CELL CARCINOMA OF SKIN OF LEFT LOWER LIMB, INCLUDING HIP: Status: ACTIVE | Noted: 2018-06-11

## 2018-06-11 PROCEDURE — 77401 RADIATION TX DELIVERY SUPFC: CPT

## 2018-06-11 PROCEDURE — OTHER TREATMENT REGIMEN: OTHER

## 2018-06-11 PROCEDURE — OTHER SUPERFICIAL RADIATION TREATMENT: OTHER

## 2018-06-11 PROCEDURE — G6001 ECHO GUIDANCE RADIOTHERAPY: HCPCS

## 2018-06-11 PROCEDURE — 77280 THER RAD SIMULAJ FIELD SMPL: CPT

## 2018-06-11 PROCEDURE — OTHER FOLLOW UP FOR NEXT VISIT: OTHER

## 2018-06-11 NOTE — PROCEDURE: TREATMENT REGIMEN
Samples Given: Aquaphor
Detail Level: Zone
Plan: Per the request of Dr. Berry, patient was seen today for Superficial Radiation Therapy requiring simulation (CPT® 60055) in preparation for treatment of specific diseased site(s). Simulation is necessary to determine correct patient and treatment portal positioning, deliver safe and effective radiation therapy. A high frequency ultrasound image was acquired prior to treatment today for three dimensional evaluation of tumor volume and response to treatment, in addition, geometric accuracy of field placement (CPT®  ). Physician evaluation of the ultrasound tumor depth will be ongoing through course of treatment, and is deemed medically necessary ensuring efficacy of treatment. Today’s image and setup was evaluated determining continuation of treatment with the current plan, or necessary changes as appropriate. All appropriate custom blocking and treatment parameters verified by the radiation therapist according to initial simulation. \\n\\nPer Dr. Berry, continued daily US guidance and simulation is required for field placement, measurement of tumor depth, progress and edema monitoring.\\n\\nEvaluation prior to treatment for response and reaction to SRT based on current fraction and cumulative dose with a visual inspection and ultrasound demonstrates a normal expected response.  RTOG Acute Radiation Morbidity Score = 1.  Superficial Radiation Therapy will continue as planned.\\n\\nUS image guidance and field placement prior to treatment delivery performed. \\nUS depth is 0.71mm, Repop +++,  TONIO equivocal

## 2018-06-11 NOTE — PROCEDURE: SUPERFICIAL RADIATION TREATMENT
Treatment Time In Min (Optional): 0.40
Total Number Of Fractions Rx 2: 15
Treatment Device Design After Initial Simulation Justification (Will Render If Bill For Treatment Devices = Yes): The patient is status post radiation simulation and is evaluated as to the use of additional devices for shielding and placement for radiation therapy.
Cumulative Dose In Cgy (Optional): 4870.47
Custom Shielding Preamble Text Will Not Be Included With Simple Simulations (.......... X X Y Cm): A lead shield of 0.762 mm thickness is utilized to form a molded, custom shield with a
Computed Treatment Time In Min (Will Render The Same As Calculated Treatment Time If Left Blank): per device
Bill For Simulation And Treatment Device Design: No
Shielding Size (Optional- Include Units) Rx 2: 2.5 x 2.5
Bill For Radiation Treatment: Yes
Detail Level: Detailed
Prescription Used: 1
Day Of The Week Treatment Administered: Monday
Dose Per Fractionation In Cgy (Optional): 257.79
Assessment: Appropriate reaction
Fractions / Week Rx 3: 5
Energy (Optional-Please Include Units): 70kV
Additional Prescription Justification Text: If there is any interruption in treatment exceeding 5 days please see Decay and Dose Adjustment Calculation and complete treatment under Prescription 2.
Functional Status: 2 (ambulatory, performs self-care)
Custom Shielding Afterword Text Will Not Be Included With Simple Simulations (X X Y Cm............): port to correlate with the lesion size, including treatment margin. The custom lead shield is adequate to accommodate the appropriate applicator and provide adequate shielding around the treatment site. Additional shielding (as noted below) is used to protect sensitive, normal tissues.
Total Number Of Fractions: 20
Energy (Optional-Please Include Units) Rx 2: 50 kV
Dimensions-X Axis In Cm: 1.5
Daily Fractionated Dose (Optional- Include Units): 257.79cGy
Comments: RTOG 1
Field Size (Applicator): 3.0 cm
Patient Positioning: Sitting
Simple Simulation Afterword Text Will Be Included With Simple Simulations (Indications............): The patient had a complete consultation regarding all applicable modalities for the treatment of their skin cancer and based on a variety of factors including the type of tumor, size, and location, the relevant medical history as well as local tissue factors, the functional status of the individual, the ability to perform necessary postoperative wound instructions and the need for simultaneous treatments as well as overall wound healing status, it was determined that the patient would begin radiation therapy treatment for skin cancer.  A full simulation and treatment device design was performed including the determination and formulation of appropriate simple and complex devices including lead shield of 0.762 mm thickness to form molded customized shielding to specifically correlate with the lesion size including treatment margin.  The custom lead shield is adequate to accommodate the appropriate applicator and provide adequate shielding around the treatment site.  The specific field applicator, shields, and devices both simple and complex as well as the specific patient setup is outlined below.  The patient was given a full consent for superficial radiation to both verbally and in writing and the full determination of patient's eligibility for treatment and selection is outlined on the patient eligibility and treatment selection form.  The specific superficial radiotherapy prescription was determined and was documented on the superficial radiotherapy prescription form.  A treatment calculation was also performed and documented on the treatment calculation form.  Based on the prescription, the patient was scheduled for a series of fractional treatments.
Treatment Time / Fractionation (Optional- Include Units): 0.39
Port Dimensions-Y Axis In Cm: 2.5
Simple Simulation Preamble Text Will Be Included With Simple Simulations (.......... Indications): Simple simulation was performed today for the following reasons:
Daily Fractionated Dose (Optional- Include Units) Rx 2: 255 cGy
Treatment Margins In Cm: 0.5
Time Dose Fractionation (Optional- Include Units If Applicable): 89
Fractions / Week Rx 2: 4
Fractionation Number: 19
Energy Output In Cgy/Min (Optional): .39
Total Dose (Optional-Please Include Units): 5155.8cGy
Dose / Tx In Cgy (Optional): 256.0
Fractions / Week: 3

## 2018-06-13 ENCOUNTER — APPOINTMENT (OUTPATIENT)
Age: 83
Setting detail: DERMATOLOGY
End: 2018-06-13

## 2018-06-13 PROBLEM — C44.719 BASAL CELL CARCINOMA OF SKIN OF LEFT LOWER LIMB, INCLUDING HIP: Status: ACTIVE | Noted: 2018-06-13

## 2018-06-13 PROCEDURE — OTHER TREATMENT REGIMEN: OTHER

## 2018-06-13 PROCEDURE — OTHER SUPERFICIAL RADIATION TREATMENT: OTHER

## 2018-06-13 PROCEDURE — G6001 ECHO GUIDANCE RADIOTHERAPY: HCPCS

## 2018-06-13 PROCEDURE — OTHER FOLLOW UP FOR NEXT VISIT: OTHER

## 2018-06-13 PROCEDURE — 77401 RADIATION TX DELIVERY SUPFC: CPT

## 2018-06-13 PROCEDURE — 77280 THER RAD SIMULAJ FIELD SMPL: CPT

## 2018-06-13 NOTE — PROCEDURE: SUPERFICIAL RADIATION TREATMENT
Total Number Of Fractions Rx 4: 15
Bill For Radiation Treatment: Yes
Energy Output In Cgy/Min (Optional): .39
Total Dose (Optional-Please Include Units): 5155.8cGy
Comments: RTOG 1
Include Rx 2 When Rendering Additional Prescriptions: No
Field Size (Applicator): 3.0 cm
Custom Shielding Afterword Text Will Not Be Included With Simple Simulations (X X Y Cm............): port to correlate with the lesion size, including treatment margin. The custom lead shield is adequate to accommodate the appropriate applicator and provide adequate shielding around the treatment site. Additional shielding (as noted below) is used to protect sensitive, normal tissues.
Custom Shielding Preamble Text Will Not Be Included With Simple Simulations (.......... X X Y Cm): A lead shield of 0.762 mm thickness is utilized to form a molded, custom shield with a
Simple Simulation Preamble Text Will Be Included With Simple Simulations (.......... Indications): Simple simulation was performed today for the following reasons:
Dimensions-Y Axis In Cm: 1.5
Computed Treatment Time In Min (Will Render The Same As Calculated Treatment Time If Left Blank): per device
Detail Level: Detailed
Time Dose Fractionation (Optional- Include Units If Applicable): 89
Dose / Tx In Cgy (Optional): 256.0
Shielding Size (Optional- Include Units) Rx 2: 2.5 x 2.5
Fractions / Week Rx 2: 4
Additional Prescription Justification Text: If there is any interruption in treatment exceeding 5 days please see Decay and Dose Adjustment Calculation and complete treatment under Prescription 2.
Energy (Optional-Please Include Units): 70kV
Cumulative Dose In Cgy (Optional): 5126.47
Daily Fractionated Dose (Optional- Include Units): 257.79cGy
Number Of Days Off Treatment: 1
Day Of The Week Treatment Administered: Wednesday
Fractionation Number: 20
Dose Per Fractionation In Cgy (Optional): 257.79
Fractions / Week: 3
Fractions / Week Rx 4: 5
Treatment Time In Min (Optional): 0.40
Energy (Optional-Please Include Units) Rx 2: 50 kV
Treatment Device Design After Initial Simulation Justification (Will Render If Bill For Treatment Devices = Yes): The patient is status post radiation simulation and is evaluated as to the use of additional devices for shielding and placement for radiation therapy.
Assessment: Appropriate reaction
Patient Positioning: Sitting
Port Dimensions-X Axis In Cm: 2.5
Treatment Time / Fractionation (Optional- Include Units): 0.39
Functional Status: 2 (ambulatory, performs self-care)
Simple Simulation Afterword Text Will Be Included With Simple Simulations (Indications............): The patient had a complete consultation regarding all applicable modalities for the treatment of their skin cancer and based on a variety of factors including the type of tumor, size, and location, the relevant medical history as well as local tissue factors, the functional status of the individual, the ability to perform necessary postoperative wound instructions and the need for simultaneous treatments as well as overall wound healing status, it was determined that the patient would begin radiation therapy treatment for skin cancer.  A full simulation and treatment device design was performed including the determination and formulation of appropriate simple and complex devices including lead shield of 0.762 mm thickness to form molded customized shielding to specifically correlate with the lesion size including treatment margin.  The custom lead shield is adequate to accommodate the appropriate applicator and provide adequate shielding around the treatment site.  The specific field applicator, shields, and devices both simple and complex as well as the specific patient setup is outlined below.  The patient was given a full consent for superficial radiation to both verbally and in writing and the full determination of patient's eligibility for treatment and selection is outlined on the patient eligibility and treatment selection form.  The specific superficial radiotherapy prescription was determined and was documented on the superficial radiotherapy prescription form.  A treatment calculation was also performed and documented on the treatment calculation form.  Based on the prescription, the patient was scheduled for a series of fractional treatments.
Daily Fractionated Dose (Optional- Include Units) Rx 2: 255 cGy
Treatment Margins In Cm: 0.5

## 2018-06-27 ENCOUNTER — APPOINTMENT (OUTPATIENT)
Age: 83
Setting detail: DERMATOLOGY
End: 2018-06-27

## 2018-06-27 PROBLEM — C44.719 BASAL CELL CARCINOMA OF SKIN OF LEFT LOWER LIMB, INCLUDING HIP: Status: ACTIVE | Noted: 2018-06-27

## 2018-06-27 PROCEDURE — OTHER TREATMENT REGIMEN: OTHER

## 2018-06-27 PROCEDURE — G6001 ECHO GUIDANCE RADIOTHERAPY: HCPCS

## 2018-06-27 PROCEDURE — OTHER SUPERFICIAL RADIATION TREATMENT: OTHER

## 2018-06-27 PROCEDURE — 77427 RADIATION TX MANAGEMENT X5: CPT

## 2018-06-27 NOTE — PROCEDURE: TREATMENT REGIMEN
Samples Given: Aquaphor
Detail Level: Zone
Plan: Per the request of Dr. Berry, Nallely Lopes was seen today for Superficial Radiation Therapy management.  A high frequency ultrasound image was acquired prior to treatment today for three dimensional evaluation of tumor volume and response to treatment, in addition, geometric accuracy of field placement (CPT®  ). Physician evaluation of the ultrasound tumor depth is ongoing through treatment and is deemed medically necessary ensuring efficacy of treatment.\\n\\n2 weeks after finishing SRT, evaluation and management of SRT based on prescription and cumulative dose for reaction and response to radiation reveals adequate healing and response with pleasing aesthetics results.  No evidence of cancerous lesion on or around treatment site visible on ultrasound or dermoscopy. RTOG = 01

## 2018-06-27 NOTE — PROCEDURE: SUPERFICIAL RADIATION TREATMENT
Total Number Of Fractions Rx 2: 15
Comments: RTOG 1
Patient Positioning: Sitting
Field Size (Applicator) Rx 2: 3.0 cm
Detail Level: Detailed
Include Rx 2 When Rendering Additional Prescriptions: No
Fractions / Week Rx 4: 5
Shielding Size (Optional- Include Units): 2.5 X 2.5
Functional Status: 2 (ambulatory, performs self-care)
Treatment Margins In Cm: 0.5
Fractionation Number (Evaluation): 20
Dose Per Fractionation In Cgy (Optional): 257.79
Day Of The Week Treatment Administered: Wednesday
Port Dimensions-X Axis In Cm: 2.5
Treatment Time In Min (Optional): 0.40
Computed Treatment Time In Min (Will Render The Same As Calculated Treatment Time If Left Blank): per device
Dimensions-X Axis In Cm: 1.5
Fractions / Week Rx 2: 4
Energy (Include Units): 70kV
Assessment: Appropriate reaction
Cumulative Dose In Cgy (Optional): 5126.47
Custom Shielding Afterword Text Will Not Be Included With Simple Simulations (X X Y Cm............): port to correlate with the lesion size, including treatment margin. The custom lead shield is adequate to accommodate the appropriate applicator and provide adequate shielding around the treatment site. Additional shielding (as noted below) is used to protect sensitive, normal tissues.
Number Of Treatment Days: 1
Time Dose Fractionation (Optional- Include Units If Applicable): 89
Custom Shielding Preamble Text Will Not Be Included With Simple Simulations (.......... X X Y Cm): A lead shield of 0.762 mm thickness is utilized to form a molded, custom shield with a
Simple Simulation Preamble Text Will Be Included With Simple Simulations (.......... Indications): Simple simulation was performed today for the following reasons:
Fractions / Week: 3
Treatment Device Design After Initial Simulation Justification (Will Render If Bill For Treatment Devices = Yes): The patient is status post radiation simulation and is evaluated as to the use of additional devices for shielding and placement for radiation therapy.
Treatment Time / Fractionation (Optional- Include Units): 0.39
Dose / Tx In Cgy (Optional): 256.0
Simple Simulation Afterword Text Will Be Included With Simple Simulations (Indications............): The patient had a complete consultation regarding all applicable modalities for the treatment of their skin cancer and based on a variety of factors including the type of tumor, size, and location, the relevant medical history as well as local tissue factors, the functional status of the individual, the ability to perform necessary postoperative wound instructions and the need for simultaneous treatments as well as overall wound healing status, it was determined that the patient would begin radiation therapy treatment for skin cancer.  A full simulation and treatment device design was performed including the determination and formulation of appropriate simple and complex devices including lead shield of 0.762 mm thickness to form molded customized shielding to specifically correlate with the lesion size including treatment margin.  The custom lead shield is adequate to accommodate the appropriate applicator and provide adequate shielding around the treatment site.  The specific field applicator, shields, and devices both simple and complex as well as the specific patient setup is outlined below.  The patient was given a full consent for superficial radiation to both verbally and in writing and the full determination of patient's eligibility for treatment and selection is outlined on the patient eligibility and treatment selection form.  The specific superficial radiotherapy prescription was determined and was documented on the superficial radiotherapy prescription form.  A treatment calculation was also performed and documented on the treatment calculation form.  Based on the prescription, the patient was scheduled for a series of fractional treatments.
Daily Fractionated Dose (Optional- Include Units): 257.79cGy
Bill And Render Text From Evaluation And Management Tab (Will Bill [id number]): Yes
Energy (Optional-Please Include Units) Rx 2: 50 kV
Daily Fractionated Dose (Optional- Include Units) Rx 2: 255 cGy
Total Dose (Optional-Please Include Units): 5155.8cGy
Energy Output In Cgy/Min (Optional): .39
Additional Prescription Justification Text: If there is any interruption in treatment exceeding 5 days please see Decay and Dose Adjustment Calculation and complete treatment under Prescription 2.

## 2018-10-01 ENCOUNTER — APPOINTMENT (OUTPATIENT)
Age: 83
Setting detail: DERMATOLOGY
End: 2018-10-01

## 2018-10-01 DIAGNOSIS — L20.89 OTHER ATOPIC DERMATITIS: ICD-10-CM

## 2018-10-01 DIAGNOSIS — L57.0 ACTINIC KERATOSIS: ICD-10-CM

## 2018-10-01 DIAGNOSIS — Z85.828 PERSONAL HISTORY OF OTHER MALIGNANT NEOPLASM OF SKIN: ICD-10-CM

## 2018-10-01 PROBLEM — L20.84 INTRINSIC (ALLERGIC) ECZEMA: Status: ACTIVE | Noted: 2018-10-01

## 2018-10-01 PROCEDURE — OTHER COUNSELING: OTHER

## 2018-10-01 PROCEDURE — OTHER TREATMENT REGIMEN: OTHER

## 2018-10-01 PROCEDURE — OTHER LIQUID NITROGEN: OTHER

## 2018-10-01 PROCEDURE — 99213 OFFICE O/P EST LOW 20 MIN: CPT | Mod: 25

## 2018-10-01 PROCEDURE — 17000 DESTRUCT PREMALG LESION: CPT

## 2018-10-01 PROCEDURE — 17003 DESTRUCT PREMALG LES 2-14: CPT

## 2018-10-01 ASSESSMENT — LOCATION DETAILED DESCRIPTION DERM
LOCATION DETAILED: LEFT DISTAL PRETIBIAL REGION
LOCATION DETAILED: RIGHT DISTAL DORSAL FOREARM
LOCATION DETAILED: RIGHT RADIAL DORSAL HAND
LOCATION DETAILED: LEFT PROXIMAL LATERAL CALF
LOCATION DETAILED: 2ND WEB SPACE LEFT HAND
LOCATION DETAILED: LEFT DORSAL WRIST

## 2018-10-01 ASSESSMENT — LOCATION SIMPLE DESCRIPTION DERM
LOCATION SIMPLE: LEFT WRIST
LOCATION SIMPLE: RIGHT FOREARM
LOCATION SIMPLE: RIGHT HAND
LOCATION SIMPLE: LEFT CALF
LOCATION SIMPLE: LEFT PRETIBIAL REGION
LOCATION SIMPLE: LEFT HAND

## 2018-10-01 ASSESSMENT — LOCATION ZONE DERM
LOCATION ZONE: ARM
LOCATION ZONE: LEG
LOCATION ZONE: HAND

## 2020-03-17 ENCOUNTER — APPOINTMENT (OUTPATIENT)
Age: 85
Setting detail: DERMATOLOGY
End: 2020-03-18

## 2020-03-17 DIAGNOSIS — Z85.828 PERSONAL HISTORY OF OTHER MALIGNANT NEOPLASM OF SKIN: ICD-10-CM

## 2020-03-17 DIAGNOSIS — L57.0 ACTINIC KERATOSIS: ICD-10-CM

## 2020-03-17 DIAGNOSIS — D485 NEOPLASM OF UNCERTAIN BEHAVIOR OF SKIN: ICD-10-CM

## 2020-03-17 DIAGNOSIS — L82.1 OTHER SEBORRHEIC KERATOSIS: ICD-10-CM

## 2020-03-17 DIAGNOSIS — L20.89 OTHER ATOPIC DERMATITIS: ICD-10-CM

## 2020-03-17 PROBLEM — D48.5 NEOPLASM OF UNCERTAIN BEHAVIOR OF SKIN: Status: ACTIVE | Noted: 2020-03-17

## 2020-03-17 PROBLEM — L20.84 INTRINSIC (ALLERGIC) ECZEMA: Status: ACTIVE | Noted: 2020-03-17

## 2020-03-17 PROCEDURE — OTHER TREATMENT REGIMEN: OTHER

## 2020-03-17 PROCEDURE — 99213 OFFICE O/P EST LOW 20 MIN: CPT | Mod: 25

## 2020-03-17 PROCEDURE — OTHER BIOPSY BY SHAVE METHOD: OTHER

## 2020-03-17 PROCEDURE — 11102 TANGNTL BX SKIN SINGLE LES: CPT

## 2020-03-17 PROCEDURE — OTHER COUNSELING: OTHER

## 2020-03-17 ASSESSMENT — LOCATION SIMPLE DESCRIPTION DERM
LOCATION SIMPLE: LEFT CALF
LOCATION SIMPLE: UPPER BACK
LOCATION SIMPLE: LEFT FOREARM
LOCATION SIMPLE: RIGHT CHEEK
LOCATION SIMPLE: CHEST
LOCATION SIMPLE: LEFT PRETIBIAL REGION
LOCATION SIMPLE: RIGHT FOREARM

## 2020-03-17 ASSESSMENT — LOCATION ZONE DERM
LOCATION ZONE: FACE
LOCATION ZONE: ARM
LOCATION ZONE: LEG
LOCATION ZONE: TRUNK

## 2020-03-17 ASSESSMENT — LOCATION DETAILED DESCRIPTION DERM
LOCATION DETAILED: RIGHT DISTAL DORSAL FOREARM
LOCATION DETAILED: MIDDLE STERNUM
LOCATION DETAILED: SUPERIOR THORACIC SPINE
LOCATION DETAILED: RIGHT MEDIAL MALAR CHEEK
LOCATION DETAILED: LEFT PROXIMAL LATERAL CALF
LOCATION DETAILED: LEFT DISTAL PRETIBIAL REGION
LOCATION DETAILED: RIGHT PROXIMAL DORSAL FOREARM
LOCATION DETAILED: LEFT DISTAL DORSAL FOREARM
LOCATION DETAILED: LEFT PROXIMAL DORSAL FOREARM

## 2020-03-17 NOTE — PROCEDURE: TREATMENT REGIMEN
Samples Given: Urea
Plan: Previously treated with LN2 - observing or recurrence
Detail Level: Zone
Samples Given: Trianex cream daily
Plan: Apply after bath or shower to affected areas

## 2020-03-17 NOTE — HPI: SKIN LESION
How Severe Is Your Skin Lesion?: severe
Has Your Skin Lesion Been Treated?: not been treated
Is This A New Presentation, Or A Follow-Up?: Skin Lesions
Additional History: Pt presents with scaly white bumps all over arms, chest, and spreading onto face. It appears to be very dry skin that is flaking. Pt states there is no itching p, pain or discomfort associated with the bumps.

## 2020-03-17 NOTE — PROCEDURE: BIOPSY BY SHAVE METHOD
Was A Bandage Applied: Yes
Biopsy Type: H and E
X Size Of Lesion In Cm: 0
Validate Lesion Size: No
Electrodesiccation And Curettage Text: The wound bed was treated with electrodesiccation and curettage after the biopsy was performed.
Wound Care: Vaseline
Type Of Destruction Used: Curettage
Anesthesia Volume In Cc: 0.5
Cryotherapy Text: The wound bed was treated with cryotherapy after the biopsy was performed.
Depth Of Biopsy: dermis
Notification Instructions: Patient will be notified of biopsy results. However, patient instructed to call the office if not contacted within 2 weeks.
Hemostasis: Electrocautery
Silver Nitrate Text: The wound bed was treated with silver nitrate after the biopsy was performed.
Billing Type: Third-Party Bill
Biopsy Method: 10 blade
Anesthesia Type: 1% lidocaine with epinephrine
Detail Level: Detailed
Electrodesiccation Text: The wound bed was treated with electrodesiccation after the biopsy was performed.
Information: Selecting Yes will display possible errors in your note based on the variables you have selected. This validation is only offered as a suggestion for you. PLEASE NOTE THAT THE VALIDATION TEXT WILL BE REMOVED WHEN YOU FINALIZE YOUR NOTE. IF YOU WANT TO FAX A PRELIMINARY NOTE YOU WILL NEED TO TOGGLE THIS TO 'NO' IF YOU DO NOT WANT IT IN YOUR FAXED NOTE.
Consent: Written consent was obtained and risks were reviewed including but not limited to scarring, infection, bleeding, scabbing, incomplete removal, nerve damage and allergy to anesthesia.  Clinical evaluation reveals changes suspicious for rule out provided in pathology requisition.  Intent of procedure is to obtain tissue sample for histopathic examination.  A skin biopsy is considered a necessary and appropriate to clarify the diagnosis.
Dressing: bandage
Curettage Text: The wound bed was treated with curettage after the biopsy was performed.
Post-Care Instructions: I reviewed with the patient in detail post-care instructions. Patient is to keep the biopsy site dry overnight, and then apply bacitracin twice daily until healed. Patient may apply hydrogen peroxide soaks to remove any crusting.